# Patient Record
Sex: MALE | Race: BLACK OR AFRICAN AMERICAN | Employment: FULL TIME | URBAN - METROPOLITAN AREA
[De-identification: names, ages, dates, MRNs, and addresses within clinical notes are randomized per-mention and may not be internally consistent; named-entity substitution may affect disease eponyms.]

---

## 2017-03-20 ENCOUNTER — OFFICE VISIT (OUTPATIENT)
Dept: INTERNAL MEDICINE CLINIC | Age: 57
End: 2017-03-20

## 2017-03-20 VITALS
WEIGHT: 187.9 LBS | DIASTOLIC BLOOD PRESSURE: 85 MMHG | BODY MASS INDEX: 30.2 KG/M2 | OXYGEN SATURATION: 97 % | SYSTOLIC BLOOD PRESSURE: 120 MMHG | HEART RATE: 79 BPM | TEMPERATURE: 98.5 F | RESPIRATION RATE: 14 BRPM | HEIGHT: 66 IN

## 2017-03-20 DIAGNOSIS — I10 ESSENTIAL HYPERTENSION: ICD-10-CM

## 2017-03-20 DIAGNOSIS — N52.9 ERECTILE DYSFUNCTION, UNSPECIFIED ERECTILE DYSFUNCTION TYPE: ICD-10-CM

## 2017-03-20 DIAGNOSIS — Z12.5 PROSTATE CANCER SCREENING: ICD-10-CM

## 2017-03-20 DIAGNOSIS — E11.21 TYPE 2 DIABETES MELLITUS WITH DIABETIC NEPHROPATHY, WITHOUT LONG-TERM CURRENT USE OF INSULIN (HCC): Primary | ICD-10-CM

## 2017-03-20 DIAGNOSIS — E78.00 PURE HYPERCHOLESTEROLEMIA: ICD-10-CM

## 2017-03-20 RX ORDER — ROSUVASTATIN CALCIUM 40 MG/1
TABLET, COATED ORAL
Qty: 90 TAB | Refills: 1 | Status: SHIPPED | OUTPATIENT
Start: 2017-03-20 | End: 2017-09-18 | Stop reason: SDUPTHER

## 2017-03-20 RX ORDER — GLIMEPIRIDE 4 MG/1
4 TABLET ORAL
Qty: 90 TAB | Refills: 1 | Status: SHIPPED | OUTPATIENT
Start: 2017-03-20 | End: 2017-09-18 | Stop reason: SDUPTHER

## 2017-03-20 RX ORDER — METFORMIN HYDROCHLORIDE 500 MG/1
TABLET, EXTENDED RELEASE ORAL
Qty: 360 TAB | Refills: 1 | Status: SHIPPED | OUTPATIENT
Start: 2017-03-20 | End: 2017-09-18 | Stop reason: SDUPTHER

## 2017-03-20 RX ORDER — ATENOLOL AND CHLORTHALIDONE TABLET 50; 25 MG/1; MG/1
1 TABLET ORAL DAILY
Qty: 90 TAB | Refills: 1 | Status: SHIPPED | OUTPATIENT
Start: 2017-03-20 | End: 2017-09-18 | Stop reason: SDUPTHER

## 2017-03-20 RX ORDER — BENAZEPRIL HYDROCHLORIDE 40 MG/1
40 TABLET ORAL DAILY
Qty: 90 TAB | Refills: 1 | Status: SHIPPED | OUTPATIENT
Start: 2017-03-20 | End: 2017-09-18 | Stop reason: SDUPTHER

## 2017-03-20 RX ORDER — SILDENAFIL 100 MG/1
100 TABLET, FILM COATED ORAL AS NEEDED
Qty: 24 TAB | Refills: 2 | Status: SHIPPED | OUTPATIENT
Start: 2017-03-20 | End: 2017-11-06 | Stop reason: SDUPTHER

## 2017-03-20 RX ORDER — AMLODIPINE BESYLATE 10 MG/1
10 TABLET ORAL DAILY
Qty: 90 TAB | Refills: 1 | Status: SHIPPED | OUTPATIENT
Start: 2017-03-20 | End: 2017-09-18 | Stop reason: SDUPTHER

## 2017-03-20 NOTE — PROGRESS NOTES
HISTORY OF PRESENT ILLNESS  Prete Bah is a 49 y.o. male. HPI  Here for dm. He has much improved control with his current meds. He exercises. He has htn that is controlled on a complex regimen. His ckd resolved. He is tolerating his statin. He has brief numbness in his feet. He needs refills. Past Medical History:   Diagnosis Date    CKD (chronic kidney disease)     Diabetes ([address]     ED (erectile dysfunction)     Hypercholesterolemia     Hypertension      Current Outpatient Prescriptions   Medication Sig    rosuvastatin (CRESTOR) 40 mg tablet TAKE 1 TABLET BY MOUTH NIGHTLY    glimepiride (AMARYL) 4 mg tablet Take 1 Tab by mouth Daily (before breakfast).  atenolol-chlorthalidone (TENORETIC 50) 50-25 mg per tablet Take 1 Tab by mouth daily.  amLODIPine (NORVASC) 10 mg tablet Take 1 Tab by mouth daily.  canagliflozin (INVOKANA) 300 mg tablet Take 1 Tab by mouth Daily (before breakfast).  benazepril (LOTENSIN) 40 mg tablet Take 1 Tab by mouth daily.  sildenafil citrate (VIAGRA) 100 mg tablet Take 1 Tab by mouth as needed.  metFORMIN ER (GLUCOPHAGE XR) 500 mg tablet TAKE 4 TABLETS BY MOUTH EVERY DAY WITH  DINNER    aspirin delayed-release 81 mg tablet Take  by mouth daily as needed for Pain. No current facility-administered medications for this visit. Review of Systems   All other systems reviewed and are negative. Visit Vitals    /85 (BP 1 Location: Left arm, BP Patient Position: Sitting)    Pulse 79    Temp 98.5 °F (36.9 °C) (Oral)    Resp 14    Ht 5' 6\" (1.676 m)    Wt 187 lb 14.4 oz (85.2 kg)    SpO2 97%    BMI 30.33 kg/m2       Physical Exam   Constitutional: He appears well-developed and well-nourished. Cardiovascular: Normal rate, regular rhythm and normal heart sounds. Pulmonary/Chest: Effort normal and breath sounds normal.   Musculoskeletal:   Foot exam - bilateral normal; no swelling, tenderness or skin or vascular lesions.  Color and temperature is normal. Sensation is intact. Peripheral pulses are palpable. Toenails are normal.     Nursing note and vitals reviewed. Lab Results   Component Value Date/Time    Hemoglobin A1c 7.3 08/29/2016 04:35 PM     Lab Results   Component Value Date/Time    Cholesterol, total 134 08/29/2016 04:35 PM    HDL Cholesterol 35 08/29/2016 04:35 PM    LDL, calculated 62 08/29/2016 04:35 PM    VLDL, calculated 37 08/29/2016 04:35 PM    Triglyceride 185 08/29/2016 04:35 PM    CHOL/HDL Ratio 3.8 10/11/2010 11:20 AM       Lab Results   Component Value Date/Time    Creatinine 1.20 08/29/2016 04:35 PM       ASSESSMENT and Sara Smith was seen today for hypertension and diabetes. Diagnoses and all orders for this visit:    Type 2 diabetes mellitus with diabetic nephropathy, without long-term current use of insulin (HCC)  -     HEMOGLOBIN A1C WITH EAG  -     glimepiride (AMARYL) 4 mg tablet; Take 1 Tab by mouth Daily (before breakfast). -     canagliflozin (INVOKANA) 300 mg tablet; Take 1 Tab by mouth Daily (before breakfast). -     metFORMIN ER (GLUCOPHAGE XR) 500 mg tablet; TAKE 4 TABLETS BY MOUTH EVERY DAY WITH  DINNER  The current medical regimen is effective;  continue present plan and medications. Essential hypertension  -     METABOLIC PANEL, COMPREHENSIVE  -     atenolol-chlorthalidone (TENORETIC 50) 50-25 mg per tablet; Take 1 Tab by mouth daily. -     amLODIPine (NORVASC) 10 mg tablet; Take 1 Tab by mouth daily. -     benazepril (LOTENSIN) 40 mg tablet; Take 1 Tab by mouth daily. The current medical regimen is effective;  continue present plan and medications. Pure hypercholesterolemia  -     LIPID PANEL  -     rosuvastatin (CRESTOR) 40 mg tablet; TAKE 1 TABLET BY MOUTH NIGHTLY   The current medical regimen is effective;  continue present plan and medications.     Prostate cancer screening  -     PSA W/ REFLX FREE PSA    Erectile dysfunction, unspecified erectile dysfunction type  -     sildenafil citrate (VIAGRA) 100 mg tablet; Take 1 Tab by mouth as needed.         Reviewed plan of care with the patient who has provided input and agrees with the goals

## 2017-03-20 NOTE — MR AVS SNAPSHOT
Visit Information Date & Time Provider Department Dept. Phone Encounter #  
 3/20/2017  1:45 PM Bernie Prado MD [facility] [phone] Your Appointments 7/10/2017  8:15 AM  
ROUTINE CARE with Bernie Prado MD  
[facility]ly [facility] [address] [facility]) Appt Note: 3 month f/u  
 [place] [address] Count includes the [facility] [id number] [place] [address] [address]. [address] [phone] Upcoming Health Maintenance Date Due Hepatitis C Screening 1960 COLONOSCOPY 12/1/1980 Pneumococcal 19-64 Medium Risk (1 of 1 - PPSV23) 12/1/1981 DTaP/Tdap/Td series (1 - Tdap) 12/1/1983 EYE EXAM RETINAL OR DILATED Q1 12/16/2015 FOOT EXAM Q1 6/15/2016 INFLUENZA AGE 9 TO ADULT 8/1/2016 HEMOGLOBIN A1C Q6M 2/28/2017 MICROALBUMIN Q1 8/29/2017 LIPID PANEL Q1 8/29/2017 Allergies as of 3/20/2017  Review Complete On: 3/20/2017 By: Xu Tran No Active Allergies Current Immunizations  Never Reviewed No immunizations on file. Not reviewed this visit You Were Diagnosed With   
  
 Codes Comments Type 2 diabetes mellitus with diabetic nephropathy, without long-term current use of insulin (HCC)    -  Primary ICD-10-CM: E11.21 
ICD-9-CM: 250.40, 583.81 Essential hypertension     ICD-10-CM: I10 
ICD-9-CM: 401.9 Pure hypercholesterolemia     ICD-10-CM: E78.00 ICD-9-CM: 272.0 Prostate cancer screening     ICD-10-CM: Z12.5 ICD-9-CM: V76.44 Erectile dysfunction, unspecified erectile dysfunction type     ICD-10-CM: N52.9 ICD-9-CM: 607.84 Vitals BP Pulse Temp Resp Height(growth percentile) Weight(growth percentile) 120/85 (BP 1 Location: Left arm, BP Patient Position: Sitting) 79 98.5 °F (36.9 °C) (Oral) 14 5' 6\" (1.676 m) 187 lb 14.4 oz (85.2 kg) SpO2 BMI Smoking Status 97% 30.33 kg/m2 Never Smoker Vitals History BMI and BSA Data Body Mass Index Body Surface Area  
 30.33 kg/m 2 1.99 m 2 Preferred Pharmacy Pharmacy Name Phone 99 [facility], Kellie Estrada [phone] Your Updated Medication List  
  
   
This list is accurate as of: 3/20/17  2:20 PM.  Always use your most recent med list. amLODIPine 10 mg tablet Commonly known as:  Derian Alstrom Take 1 Tab by mouth daily. aspirin delayed-release 81 mg tablet Take  by mouth daily as needed for Pain. atenolol-chlorthalidone 50-25 mg per tablet Commonly known as:  TENORETIC 50 Take 1 Tab by mouth daily. benazepril 40 mg tablet Commonly known as:  LOTENSIN Take 1 Tab by mouth daily. canagliflozin 300 mg tablet Commonly known as:  Yvetta Eisenmenger Take 1 Tab by mouth Daily (before breakfast). glimepiride 4 mg tablet Commonly known as:  AMARYL Take 1 Tab by mouth Daily (before breakfast). metFORMIN  mg tablet Commonly known as:  GLUCOPHAGE XR  
TAKE 4 TABLETS BY MOUTH EVERY DAY WITH  DINNER  
  
 rosuvastatin 40 mg tablet Commonly known as:  CRESTOR  
TAKE 1 TABLET BY MOUTH NIGHTLY  
  
 sildenafil citrate 100 mg tablet Commonly known as:  VIAGRA Take 1 Tab by mouth as needed. Prescriptions Sent to Pharmacy Refills  
 rosuvastatin (CRESTOR) 40 mg tablet 1 Sig: TAKE 1 TABLET BY MOUTH NIGHTLY Class: Normal  
 Pharmacy: [address] [place], [place] [address]dav[place]  Ph #: [phone]  
 glimepiride (AMARYL) 4 mg tablet 1 Sig: Take 1 Tab by mouth Daily (before breakfast). Class: Normal  
 [facility] [address] [place] [address]  Ph #: [phone] Route: Oral  
 atenolol-chlorthalidone (TENORETIC 50) 50-25 mg per tablet 1 Sig: Take 1 Tab by mouth daily.   
 Class: Normal  
 Pharm[facility]: [address] [place] [address] Ph #: [phone] Route: Oral  
 amLODIPine (NORVASC) 10 mg tablet 1 Sig: Take 1 Tab by mouth daily. Class: Normal  
 [facility] [address] [place] [place] [address]: [phone] Route: Oral  
 canagliflozin (INVOKANA) 300 mg tablet 1 Sig: Take 1 Tab by mouth Daily (before breakfast). Class: Normal  
 [facility] [address] [place] [place] [address]: [phone] Route: Oral  
 benazepril (LOTENSIN) 40 mg tablet 1 Sig: Take 1 Tab by mouth daily. Class: Normal  
 [facility] [address] [place] [place] [address] Ph #: [phone] Route: Oral  
 sildenafil citrate (VIAGRA) 100 mg tablet 2 Sig: Take 1 Tab by mouth as needed. Class: Normal  
 Pharmacy: [address] [place] [address] Ph #: [phone] Route: Oral  
 metFORMIN ER (GLUCOPHAGE XR) 500 mg tablet 1 Sig: TAKE 4 TABLETS BY MOUTH EVERY DAY WITH  DINNER Class: Normal  
 [facility] [address] [place] [place] [address] Ph #: [phone] We Performed the Following HEMOGLOBIN A1C WITH EAG [00801 CPT(R)] LIPID PANEL [24557 CPT(R)] METABOLIC PANEL, COMPREHENSIVE [48332 CPT(R)] PSA W/ REFLX FREE PSA [81638 CPT(R)] Introducing [facility] & HEALTH SERVICES! Nikolas See introduces TrialBee patient portal. Now you can access parts of your medical record, email your doctor's office, and request medication refills online. 1. In your internet browser, go to [email or web address] 2. Click on the First Time User? Click Here link in the Sign In box. You will see the New Member Sign Up page. 3. Enter your TrialBee Access Code exactly as it appears below. You will not need to use this code after youve completed the sign-up process. If you do not sign up before the expiration date, you must request a new code. · TrialBee Access Code: [email or web address] Expires: 6/18/2017  2:20 PM 
 
4. Enter the last four digits of your Social Security Number (xxxx) and Date of Birth (mm/dd/yyyy) as indicated and click Submit. You will be taken to the next sign-up page. 5. Create a Stiki Digital ID. This will be your Stiki Digital login ID and cannot be changed, so think of one that is secure and easy to remember. 6. Create a Stiki Digital password. You can change your password at any time. 7. Enter your Password Reset Question and Answer. This can be used at a later time if you forget your password. 8. Enter your e-mail address. You will receive e-mail notification when new information is available in [address] 9. Click Sign Up. You can now view and download portions of your medical record. 10. Click the Download Summary menu link to download a portable copy of your medical information. If you have questions, please visit the Frequently Asked Questions section of the Stiki Digital website. Remember, Stiki Digital is NOT to be used for urgent needs. For medical emergencies, dial 911. Now available from your iPhone and Android! Please provide this summary of care documentation to your next provider. Your primary care clinician is listed as [address] [address] [place] [address] [address]. If you have any questions after today's visit, please call [phone].

## 2017-03-21 LAB
ALBUMIN SERPL-MCNC: 4.5 G/DL (ref 3.5–5.5)
ALBUMIN/GLOB SERPL: 1.3 {RATIO} (ref 1.2–2.2)
ALP SERPL-CCNC: 68 IU/L (ref 39–117)
ALT SERPL-CCNC: 22 IU/L (ref 0–44)
AST SERPL-CCNC: 18 IU/L (ref 0–40)
BILIRUB SERPL-MCNC: 0.9 MG/DL (ref 0–1.2)
BUN SERPL-MCNC: 20 MG/DL (ref 6–24)
BUN/CREAT SERPL: 17 (ref 9–20)
CALCIUM SERPL-MCNC: 9.8 MG/DL (ref 8.7–10.2)
CHLORIDE SERPL-SCNC: 93 MMOL/L (ref 96–106)
CHOLEST SERPL-MCNC: 116 MG/DL (ref 100–199)
CO2 SERPL-SCNC: 25 MMOL/L (ref 18–29)
CREAT SERPL-MCNC: 1.18 MG/DL (ref 0.76–1.27)
EST. AVERAGE GLUCOSE BLD GHB EST-MCNC: 163 MG/DL
GLOBULIN SER CALC-MCNC: 3.6 G/DL (ref 1.5–4.5)
GLUCOSE SERPL-MCNC: 84 MG/DL (ref 65–99)
HBA1C MFR BLD: 7.3 % (ref 4.8–5.6)
HDLC SERPL-MCNC: 33 MG/DL
INTERPRETATION, 910389: NORMAL
LDLC SERPL CALC-MCNC: 54 MG/DL (ref 0–99)
Lab: NORMAL
POTASSIUM SERPL-SCNC: 3.6 MMOL/L (ref 3.5–5.2)
PROT SERPL-MCNC: 8.1 G/DL (ref 6–8.5)
PSA SERPL-MCNC: 1.2 NG/ML (ref 0–4)
REFLEX CRITERIA: NORMAL
SODIUM SERPL-SCNC: 140 MMOL/L (ref 134–144)
TRIGL SERPL-MCNC: 147 MG/DL (ref 0–149)
VLDLC SERPL CALC-MCNC: 29 MG/DL (ref 5–40)

## 2017-09-18 ENCOUNTER — OFFICE VISIT (OUTPATIENT)
Dept: INTERNAL MEDICINE CLINIC | Age: 57
End: 2017-09-18

## 2017-09-18 VITALS
HEIGHT: 66 IN | BODY MASS INDEX: 29.47 KG/M2 | DIASTOLIC BLOOD PRESSURE: 86 MMHG | RESPIRATION RATE: 16 BRPM | WEIGHT: 183.4 LBS | TEMPERATURE: 98.1 F | OXYGEN SATURATION: 98 % | SYSTOLIC BLOOD PRESSURE: 111 MMHG | HEART RATE: 73 BPM

## 2017-09-18 DIAGNOSIS — E78.00 PURE HYPERCHOLESTEROLEMIA: ICD-10-CM

## 2017-09-18 DIAGNOSIS — Z11.3 SCREEN FOR STD (SEXUALLY TRANSMITTED DISEASE): ICD-10-CM

## 2017-09-18 DIAGNOSIS — I10 ESSENTIAL HYPERTENSION: ICD-10-CM

## 2017-09-18 DIAGNOSIS — E11.21 TYPE 2 DIABETES MELLITUS WITH DIABETIC NEPHROPATHY, WITHOUT LONG-TERM CURRENT USE OF INSULIN (HCC): Primary | ICD-10-CM

## 2017-09-18 RX ORDER — METFORMIN HYDROCHLORIDE 500 MG/1
TABLET, EXTENDED RELEASE ORAL
Qty: 360 TAB | Refills: 1 | Status: SHIPPED | OUTPATIENT
Start: 2017-09-18 | End: 2018-09-21 | Stop reason: SDUPTHER

## 2017-09-18 RX ORDER — BENAZEPRIL HYDROCHLORIDE 40 MG/1
40 TABLET ORAL DAILY
Qty: 90 TAB | Refills: 1 | Status: SHIPPED | OUTPATIENT
Start: 2017-09-18 | End: 2018-03-28 | Stop reason: SDUPTHER

## 2017-09-18 RX ORDER — AMLODIPINE BESYLATE 10 MG/1
10 TABLET ORAL DAILY
Qty: 90 TAB | Refills: 1 | Status: SHIPPED | OUTPATIENT
Start: 2017-09-18 | End: 2018-03-28 | Stop reason: SDUPTHER

## 2017-09-18 RX ORDER — ATENOLOL AND CHLORTHALIDONE TABLET 50; 25 MG/1; MG/1
1 TABLET ORAL DAILY
Qty: 90 TAB | Refills: 1 | Status: SHIPPED | OUTPATIENT
Start: 2017-09-18 | End: 2018-03-28 | Stop reason: SDUPTHER

## 2017-09-18 RX ORDER — GLIMEPIRIDE 4 MG/1
4 TABLET ORAL
Qty: 90 TAB | Refills: 1 | Status: SHIPPED | OUTPATIENT
Start: 2017-09-18 | End: 2018-03-28 | Stop reason: SDUPTHER

## 2017-09-18 RX ORDER — ROSUVASTATIN CALCIUM 40 MG/1
TABLET, COATED ORAL
Qty: 90 TAB | Refills: 1 | Status: SHIPPED | OUTPATIENT
Start: 2017-09-18 | End: 2018-09-21 | Stop reason: SDUPTHER

## 2017-09-18 NOTE — PROGRESS NOTES
1. Have you been to the ER, urgent care clinic since your last visit? Hospitalized since your last visit? No    2. Have you seen or consulted any other health care providers outside of the [address] [place] [address] since your last visit? Include any pap smears or colon screening.  No   Chief Complaint   Patient presents with    Diabetes     3 months follow up    Hypertension     3 months follow up    Cholesterol Problem     3 months follow up     Fasting

## 2017-09-18 NOTE — MR AVS SNAPSHOT
Visit Information Date & Time Provider Department Dept. Phone Encounter #  
 9/18/2017 10:30 AM Nicola Blanchard MD [facility] [phone] [id number] Upcoming Health Maintenance Date Due Hepatitis C Screening 1960 COLONOSCOPY 12/1/1980 Pneumococcal 19-64 Medium Risk (1 of 1 - PPSV23) 12/1/1981 DTaP/Tdap/Td series (1 - Tdap) 12/1/1983 EYE EXAM RETINAL OR DILATED Q1 12/16/2015 INFLUENZA AGE 9 TO ADULT 8/1/2017 MICROALBUMIN Q1 8/29/2017 HEMOGLOBIN A1C Q6M 9/20/2017 FOOT EXAM Q1 3/20/2018 LIPID PANEL Q1 3/20/2018 Allergies as of 9/18/2017  Review Complete On: 9/18/2017 By: Rocael Stephenson No Active Allergies Current Immunizations  Never Reviewed No immunizations on file. Not reviewed this visit You Were Diagnosed With   
  
 Codes Comments Type 2 diabetes mellitus with diabetic nephropathy, without long-term current use of insulin (HCC)    -  Primary ICD-10-CM: E11.21 
ICD-9-CM: 250.40, 583.81 Pure hypercholesterolemia     ICD-10-CM: E78.00 ICD-9-CM: 272.0 Essential hypertension     ICD-10-CM: I10 
ICD-9-CM: 401.9 Screen for STD (sexually transmitted disease)     ICD-10-CM: Z11.3 ICD-9-CM: V74.5 Vitals BP Pulse Temp Resp Height(growth percentile) Weight(growth percentile) 111/86 (BP 1 Location: Left arm, BP Patient Position: At rest) 73 98.1 °F (36.7 °C) (Oral) 16 5' 6\" (1.676 m) 183 lb 6.4 oz (83.2 kg) SpO2 BMI Smoking Status 98% 29.6 kg/m2 Never Smoker BMI and BSA Data Body Mass Index Body Surface Area  
 29.6 kg/m 2 1.97 m 2 Preferred Pharmacy Pharmacy Name Phone 99 [facility], [facility] Rosemary Estrada [phone] Your Updated Medication List  
  
   
This list is accurate as of: 9/18/17 10:51 AM.  Always use your most recent med list. amLODIPine 10 mg tablet Commonly known as:  Giovanni Rings Take 1 Tab by mouth daily. aspirin delayed-release 81 mg tablet Take  by mouth daily as needed for Pain. atenolol-chlorthalidone 50-25 mg per tablet Commonly known as:  TENORETIC 50 Take 1 Tab by mouth daily. benazepril 40 mg tablet Commonly known as:  LOTENSIN Take 1 Tab by mouth daily. empagliflozin 25 mg tablet Commonly known as:  Dellis Mutters Take 1 Tab by mouth daily. glimepiride 4 mg tablet Commonly known as:  AMARYL Take 1 Tab by mouth Daily (before breakfast). metFORMIN  mg tablet Commonly known as:  GLUCOPHAGE XR  
TAKE 4 TABLETS BY MOUTH EVERY DAY WITH  DINNER  
  
 rosuvastatin 40 mg tablet Commonly known as:  CRESTOR  
TAKE 1 TABLET BY MOUTH NIGHTLY  
  
 sildenafil citrate 100 mg tablet Commonly known as:  VIAGRA Take 1 Tab by mouth as needed. Prescriptions Sent to Pharmacy Refills  
 rosuvastatin (CRESTOR) 40 mg tablet 1 Sig: TAKE 1 TABLET BY MOUTH NIGHTLY Class: Normal  
 Pharmacy: [address] [place] [address], [facility]  Ph #: [phone]  
 glimepiride (AMARYL) 4 mg tablet 1 Sig: Take 1 Tab by mouth Daily (before breakfast). Class: Normal  
 Pharmacy: [address] [place] [address], [facility]  Ph #: [phone] Route: Oral  
 atenolol-chlorthalidone (TENORETIC 50) 50-25 mg per tablet 1 Sig: Take 1 Tab by mouth daily. Class: Normal  
 Pharmacy: [address] [place] [address], [facility]  Ph #: [phone] Route: Oral  
 amLODIPine (NORVASC) 10 mg tablet 1 Sig: Take 1 Tab by mouth daily. Class: Normal  
 Pharmacy: [address] [place] [address], [facility] Ph #: [phone] Route: Oral  
 benazepril (LOTENSIN) 40 mg tablet 1 Sig: Take 1 Tab by mouth daily. Class: Normal  
 Pharmacy: [address] [place] [address], [facility] Ph #: [phone]  Route: Oral  
 metFORMIN ER (GLUCOPHAGE XR) 500 mg tablet 1 Sig: TAKE 4 TABLETS BY MOUTH EVERY DAY WITH  DINNER Class: Normal  
 Pharmacy: [address] [facility] Ph #: [phone]  
 empagliflozin (JARDIANCE) 25 mg tablet 1 Sig: Take 1 Tab by mouth daily. Class: Normal  
 Pharmacy: [address] [facility] Ph #: [phone] Route: Oral  
  
We Performed the Following HEMOGLOBIN A1C WITH EAG [54717 CPT(R)] HEPATITIS C AB [09921 CPT(R)] HIV 1/2 AG/AB, 4TH GENERATION,W RFLX CONFIRM [DBD71250 Custom]  DIABETES FOOT EXAM [HM7 Custom] LIPID PANEL [57432 CPT(R)] METABOLIC PANEL, COMPREHENSIVE [29121 CPT(R)] Introducing [facility] & [facility]! [employer] introduces CPXi patient portal. Now you can access parts of your medical record, email your doctor's office, and request medication refills online. 1. In your internet browser, go to [email or web address] 2. Click on the First Time User? Click Here link in the Sign In box. You will see the New Member Sign Up page. 3. Enter your CPXi Access Code exactly as it appears below. You will not need to use this code after youve completed the sign-up process. If you do not sign up before the expiration date, you must request a new code. · CPXi Access Code: [email or web address] Expires: 12/17/2017 10:51 AM 
 
4. Enter the last four digits of your Social Security Number (xxxx) and Date of Birth (mm/dd/yyyy) as indicated and click Submit. You will be taken to the next sign-up page. 5. Create a Mango Telecomt ID. This will be your CPXi login ID and cannot be changed, so think of one that is secure and easy to remember. 6. Create a CPXi password. You can change your password at any time. 7. Enter your Password Reset Question and Answer. This can be used at a later time if you forget your password. 8. Enter your e-mail address. You will receive e-mail notification when new information is available in [address] 9. Click Sign Up. You can now view and download portions of your medical record. 10. Click the Download Summary menu link to download a portable copy of your medical information. If you have questions, please visit the Frequently Asked Questions section of the KidsCash website. Remember, KidsCash is NOT to be used for urgent needs. For medical emergencies, dial 911. Now available from your iPhone and Android! Please provide this summary of care documentation to your next provider. Your primary care clinician is listed as [address] [place] [address] [address]. If you have any questions after today's visit, please call [phone].

## 2017-09-18 NOTE — PROGRESS NOTES
HISTORY OF PRESENT ILLNESS  Kortney Otto is a 49 y.o. male. HPI  Here for DM. He is controlled on oral agents. He is exercising sporadically and trying to watch his carbs. He is on oral agents. He has controlled HTN on a complex regimen. He is tolerating a statin for hld. He wants STD screen per his new girlfriend.,  Past Medical History:   Diagnosis Date    CKD (chronic kidney disease)     Diabetes ([address]     ED (erectile dysfunction)     Hypercholesterolemia     Hypertension      Current Outpatient Prescriptions   Medication Sig    rosuvastatin (CRESTOR) 40 mg tablet TAKE 1 TABLET BY MOUTH NIGHTLY    glimepiride (AMARYL) 4 mg tablet Take 1 Tab by mouth Daily (before breakfast).  atenolol-chlorthalidone (TENORETIC 50) 50-25 mg per tablet Take 1 Tab by mouth daily.  amLODIPine (NORVASC) 10 mg tablet Take 1 Tab by mouth daily.  benazepril (LOTENSIN) 40 mg tablet Take 1 Tab by mouth daily.  metFORMIN ER (GLUCOPHAGE XR) 500 mg tablet TAKE 4 TABLETS BY MOUTH EVERY DAY WITH  DINNER    empagliflozin (JARDIANCE) 25 mg tablet Take 1 Tab by mouth daily.  sildenafil citrate (VIAGRA) 100 mg tablet Take 1 Tab by mouth as needed.  aspirin delayed-release 81 mg tablet Take  by mouth daily as needed for Pain. No current facility-administered medications for this visit. Review of Systems   All other systems reviewed and are negative. Visit Vitals    /86 (BP 1 Location: Left arm, BP Patient Position: At rest)    Pulse 73    Temp 98.1 °F (36.7 °C) (Oral)    Resp 16    Ht 5' 6\" (1.676 m)    Wt 183 lb 6.4 oz (83.2 kg)    SpO2 98%    BMI 29.6 kg/m2       Physical Exam   Constitutional: He appears well-developed and well-nourished. Cardiovascular: Normal rate, regular rhythm and normal heart sounds. Pulmonary/Chest: Effort normal and breath sounds normal. No respiratory distress. He has no wheezes. He has no rales.    Musculoskeletal:   Foot exam - bilateral normal; no swelling, tenderness or skin or vascular lesions. Color and temperature is normal. Sensation is intact. Peripheral pulses are palpable. Toenails are normal.     Nursing note and vitals reviewed. Lab Results   Component Value Date/Time    Hemoglobin A1c 7.3 03/20/2017 02:26 PM     Lab Results   Component Value Date/Time    Cholesterol, total 116 03/20/2017 02:26 PM    HDL Cholesterol 33 03/20/2017 02:26 PM    LDL, calculated 54 03/20/2017 02:26 PM    VLDL, calculated 29 03/20/2017 02:26 PM    Triglyceride 147 03/20/2017 02:26 PM    CHOL/HDL Ratio 3.8 10/11/2010 11:20 AM       ASSESSMENT and PLAN  Diagnoses and all orders for this visit:    1. Type 2 diabetes mellitus with diabetic nephropathy, without long-term current use of insulin (HCC)  -     glimepiride (AMARYL) 4 mg tablet; Take 1 Tab by mouth Daily (before breakfast). -     metFORMIN ER (GLUCOPHAGE XR) 500 mg tablet; TAKE 4 TABLETS BY MOUTH EVERY DAY WITH  DINNER  -     empagliflozin (JARDIANCE) 25 mg tablet; Take 1 Tab by mouth daily.  -     HEMOGLOBIN A1C WITH EAG  -      DIABETES FOOT EXAM  The current medical regimen is effective;  continue present plan and medications. He will stop invokana with recent literature regarding PAD. 2. Pure hypercholesterolemia  -     rosuvastatin (CRESTOR) 40 mg tablet; TAKE 1 TABLET BY MOUTH NIGHTLY  -     LIPID PANEL  The current medical regimen is effective;  continue present plan and medications. 3. Essential hypertension  -     atenolol-chlorthalidone (TENORETIC 50) 50-25 mg per tablet; Take 1 Tab by mouth daily. -     amLODIPine (NORVASC) 10 mg tablet; Take 1 Tab by mouth daily. -     benazepril (LOTENSIN) 40 mg tablet; Take 1 Tab by mouth daily.  -     METABOLIC PANEL, COMPREHENSIVE  The current medical regimen is effective;  continue present plan and medications.     4. Screen for STD (sexually transmitted disease)  -     HIV 1/2 AG/AB, 4TH GENERATION,W RFLX CONFIRM  -     HEPATITIS C AB    Reviewed plan of care with the patient who has provided input and agrees with the goals

## 2017-09-19 LAB
ALBUMIN SERPL-MCNC: 4.5 G/DL (ref 3.5–5.5)
ALBUMIN/GLOB SERPL: 1.3 {RATIO} (ref 1.2–2.2)
ALP SERPL-CCNC: 71 IU/L (ref 39–117)
ALT SERPL-CCNC: 18 IU/L (ref 0–44)
AST SERPL-CCNC: 16 IU/L (ref 0–40)
BILIRUB SERPL-MCNC: 1.2 MG/DL (ref 0–1.2)
BUN SERPL-MCNC: 26 MG/DL (ref 6–24)
BUN/CREAT SERPL: 17 (ref 9–20)
CALCIUM SERPL-MCNC: 9.9 MG/DL (ref 8.7–10.2)
CHLORIDE SERPL-SCNC: 97 MMOL/L (ref 96–106)
CHOLEST SERPL-MCNC: 132 MG/DL (ref 100–199)
CO2 SERPL-SCNC: 27 MMOL/L (ref 18–29)
CREAT SERPL-MCNC: 1.51 MG/DL (ref 0.76–1.27)
EST. AVERAGE GLUCOSE BLD GHB EST-MCNC: 151 MG/DL
GLOBULIN SER CALC-MCNC: 3.6 G/DL (ref 1.5–4.5)
GLUCOSE SERPL-MCNC: 102 MG/DL (ref 65–99)
HBA1C MFR BLD: 6.9 % (ref 4.8–5.6)
HCV AB S/CO SERPL IA: <0.1 S/CO RATIO (ref 0–0.9)
HDLC SERPL-MCNC: 35 MG/DL
HIV 1+2 AB+HIV1 P24 AG SERPL QL IA: NON REACTIVE
INTERPRETATION, 910389: NORMAL
INTERPRETATION: NORMAL
LDLC SERPL CALC-MCNC: 67 MG/DL (ref 0–99)
Lab: NORMAL
PDF IMAGE, 910387: NORMAL
POTASSIUM SERPL-SCNC: 4 MMOL/L (ref 3.5–5.2)
PROT SERPL-MCNC: 8.1 G/DL (ref 6–8.5)
SODIUM SERPL-SCNC: 140 MMOL/L (ref 134–144)
TRIGL SERPL-MCNC: 150 MG/DL (ref 0–149)
VLDLC SERPL CALC-MCNC: 30 MG/DL (ref 5–40)

## 2017-09-27 ENCOUNTER — TELEPHONE (OUTPATIENT)
Dept: INTERNAL MEDICINE CLINIC | Age: 57
End: 2017-09-27

## 2017-09-28 NOTE — TELEPHONE ENCOUNTER
Spoke with patient. Advised per Dr Omalley Ear that Do not fast next visit which should lower the creatinine.  Patient verbalized understanding

## 2017-11-21 DIAGNOSIS — N52.9 ERECTILE DYSFUNCTION, UNSPECIFIED ERECTILE DYSFUNCTION TYPE: ICD-10-CM

## 2017-11-21 RX ORDER — SILDENAFIL 100 MG/1
100 TABLET, FILM COATED ORAL AS NEEDED
Qty: 24 TAB | Refills: 11 | Status: SHIPPED | OUTPATIENT
Start: 2017-11-21 | End: 2019-08-07 | Stop reason: SDUPTHER

## 2018-03-19 ENCOUNTER — OFFICE VISIT (OUTPATIENT)
Dept: INTERNAL MEDICINE CLINIC | Age: 58
End: 2018-03-19

## 2018-03-19 VITALS
BODY MASS INDEX: 29.41 KG/M2 | OXYGEN SATURATION: 96 % | SYSTOLIC BLOOD PRESSURE: 114 MMHG | WEIGHT: 183 LBS | RESPIRATION RATE: 20 BRPM | TEMPERATURE: 98.4 F | HEIGHT: 66 IN | DIASTOLIC BLOOD PRESSURE: 72 MMHG | HEART RATE: 73 BPM

## 2018-03-19 DIAGNOSIS — I10 ESSENTIAL HYPERTENSION: ICD-10-CM

## 2018-03-19 DIAGNOSIS — E78.00 PURE HYPERCHOLESTEROLEMIA: ICD-10-CM

## 2018-03-19 DIAGNOSIS — Z12.11 COLON CANCER SCREENING: ICD-10-CM

## 2018-03-19 DIAGNOSIS — E11.21 TYPE 2 DIABETES MELLITUS WITH DIABETIC NEPHROPATHY, WITHOUT LONG-TERM CURRENT USE OF INSULIN (HCC): Primary | ICD-10-CM

## 2018-03-19 NOTE — MR AVS SNAPSHOT
[address] [place] [address] [address] [facility] 
[phone] Patient: Mirella Orourke MRN: [id number] :1960 Visit Information Date & Time Provider Department Dept. Phone Encounter #  
 3/19/2018 10:30 AM Santa Hahn PA-C [facility] [phone] [id number] Upcoming Health Maintenance Date Due COLONOSCOPY 1980 Pneumococcal 19-64 Medium Risk (1 of 1 - PPSV23) 1981 DTaP/Tdap/Td series (1 - Tdap) 1983 EYE EXAM RETINAL OR DILATED Q1 2015 Influenza Age 5 to Adult 2017 MICROALBUMIN Q1 2017 HEMOGLOBIN A1C Q6M 3/18/2018 FOOT EXAM Q1 2018 LIPID PANEL Q1 2018 Allergies as of 3/19/2018  In Progress On: 3/19/2018 By: Glorya Hashimoto, LPN No Active Allergies Current Immunizations  Never Reviewed No immunizations on file. Not reviewed this visit You Were Diagnosed With   
  
 Codes Comments Type 2 diabetes mellitus with diabetic nephropathy, without long-term current use of insulin (HCC)    -  Primary ICD-10-CM: E11.21 
ICD-9-CM: 250.40, 583.81 Essential hypertension     ICD-10-CM: I10 
ICD-9-CM: 401.9 Pure hypercholesterolemia     ICD-10-CM: E78.00 ICD-9-CM: 272.0 Colon cancer screening     ICD-10-CM: Z12.11 ICD-9-CM: V76.51 Vitals BP Pulse Temp Resp Height(growth percentile) Weight(growth percentile) 114/72 73 98.4 °F (36.9 °C) (Oral) 20 5' 6\" (1.676 m) 183 lb (83 kg) SpO2 BMI Smoking Status 96% 29.54 kg/m2 Never Smoker Vitals History BMI and BSA Data Body Mass Index Body Surface Area  
 29.54 kg/m 2 1.97 m 2 Preferred Pharmacy Pharmacy Name Phone [facility] - Bobby Sinhama - [address] [place] [address],[address] Your Updated Medication List  
  
   
This list is accurate as of 3/19/18 11:30 AM.  Always use your most recent med list.  
  
  
 amLODIPine 10 mg tablet Commonly known as:  Thayer Sandhoff Take 1 Tab by mouth daily. aspirin delayed-release 81 mg tablet Take  by mouth daily as needed for Pain. atenolol-chlorthalidone 50-25 mg per tablet Commonly known as:  TENORETIC 50 Take 1 Tab by mouth daily. benazepril 40 mg tablet Commonly known as:  LOTENSIN Take 1 Tab by mouth daily. empagliflozin 25 mg tablet Commonly known as:  Merlin Martinis Take 1 Tab by mouth daily. glimepiride 4 mg tablet Commonly known as:  AMARYL Take 1 Tab by mouth Daily (before breakfast). metFORMIN  mg tablet Commonly known as:  GLUCOPHAGE XR  
TAKE 4 TABLETS BY MOUTH EVERY DAY WITH  DINNER  
  
 rosuvastatin 40 mg tablet Commonly known as:  CRESTOR  
TAKE 1 TABLET BY MOUTH NIGHTLY  
  
 sildenafil citrate 100 mg tablet Commonly known as:  VIAGRA Take 1 Tab by mouth as needed. We Performed the Following HEMOGLOBIN A1C WITH EAG [43388 CPT(R)] LIPID PANEL [92323 CPT(R)] METABOLIC PANEL, COMPREHENSIVE [20851 CPT(R)] MICROALBUMIN, UR, RAND W/ MICROALB/CREAT RATIO [id number] CPT(R)] REFERRAL TO GASTROENTEROLOGY [NPN33 Custom] Referral Information Referral ID Referred By Referred To  
  
 [id number] Radha TORRES MD   
   [address] [place] [address] Phone: [phone] Fax: [phone] Visits Status Start Date End Date 1 New Request 3/19/18 3/19/19 If your referral has a status of pending review or denied, additional information will be sent to support the outcome of this decision. Introducing [facility]! Lyndsay Johnson introduces [email or web address] patient portal. Now you can access parts of your medical record, email your doctor's office, and request medication refills online. 1. In your internet browser, go to [email or web address] [email or web address] 2. Click on the First Time User? Click Here link in the Sign In box. You will see the New Member Sign Up page. 3. Enter your Cod[employer] Access Code exactly as it appears below. You will not need to use this code after youve completed the sign-up process. If you do not sign up before the expiration date, you must request a new code. · Cod[employer] Access Code: [facility] Expires: 6/17/2018 11:30 AM 
 
4. Enter the last four digits of your Social Security Number (xxxx) and Date of Birth (mm/dd/yyyy) as indicated and click Submit. You will be taken to the next sign-up page. 5. Create a Codasip ID. This will be your Codasip login ID and cannot be changed, so think of one that is secure and easy to remember. 6. Create a Cod[employer] password. You can change your password at any time. 7. Enter your Password Reset Question and Answer. This can be used at a later time if you forget your password. 8. Enter your e-mail address. You will receive e-mail notification when new information is available in [address] 9. Click Sign Up. You can now view and download portions of your medical record. 10. Click the Download Summary menu link to download a portable copy of your medical information. If you have questions, please visit the Frequently Asked Questions section of the [employer] website. Remember, [employer]ip is NOT to be used for urgent needs. For medical emergencies, dial 911. Now available from your iPhone and Android! Please provide this summary of care documentation to your next provider. Your primary care clinician is listed as [id number] [address] [place] [address] [address]. If you have any questions after today's visit, please call [phone].

## 2018-03-19 NOTE — PROGRESS NOTES
Subjective: Dick Mclaughlin is a 56 y.o. male seen for follow up of diabetes. He also has diabetes, hypertension and hyperlipidemia. Diabetic Review of Systems - medication compliance: compliant all of the time, diabetic diet compliance: \"could be better\". Other symptoms and concerns: no concerns at this time. Will need labs done today  Patient admits right now he is not exercising regularly. He plans to get back on track with this once the weather turns warm again. He likes to eat the halo oranges and apples. He admits to still eating steak and hamburger. He does eat some fish and chicken. Patient Active Problem List    Diagnosis Date Noted    Essential hypertension 03/20/2017    Pure hypercholesterolemia 03/20/2017    Type 2 diabetes mellitus with diabetic nephropathy ([facility] [address] 11/09/2015    ED (erectile dysfunction) 05/09/2011    CKD (chronic kidney disease)      No Active Allergies          Review of Systems  Pertinent items are noted in HPI. Objective:     Visit Vitals    /72    Pulse 73    Temp 98.4 °F (36.9 °C) (Oral)    Resp 20    Ht 5' 6\" (1.676 m)    Wt 183 lb (83 kg)    SpO2 96%    BMI 29.54 kg/m2     Appearance: alert, well appearing, and in no distress. Exam: heart sounds normal rate and regular rhythm, no murmurs noted, chest clear, feet: no peripheral edema  Lab review: orders written for new lab studies as appropriate; see orders. Assessment/Plan:     diabetes stable, hypertension well controlled, hyperlipidemia control uncertain. Diagnoses and all orders for this visit:    1. Type 2 diabetes mellitus with diabetic nephropathy, without long-term current use of insulin ([facility])  -     METABOLIC PANEL, COMPREHENSIVE  -     HEMOGLOBIN A1C WITH EAG  -     MICROALBUMIN, UR, RAND W/ MICROALB/CREAT RATIO    2. Essential hypertension    3. Pure hypercholesterolemia  -     LIPID PANEL    4. Colon cancer screening  -     REFERRAL TO GASTROENTEROLOGY    patient to get labs done. We discussed today making an appointment to have his colonoscopy done. Routine labs ordered today. I will contact him with the labs once back. All this discussed with the patient and he understands and agrees.

## 2018-03-19 NOTE — PROGRESS NOTES
Reviewed record in preparation for visit and have obtained necessary documentation. Identified pt with two pt identifiers(name and ). Health Maintenance Due   Topic    COLONOSCOPY     Pneumococcal 19-64 Medium Risk (1 of 1 - PPSV23)    DTaP/Tdap/Td series (1 - Tdap)    EYE EXAM RETINAL OR DILATED Q1     Influenza Age 5 to Adult     MICROALBUMIN Q1     HEMOGLOBIN A1C Q6M          Chief Complaint   Patient presents with    Follow Up Chronic Condition     Pain of 2 fatty liver, diabetes, blood work,         [employer] Readings from Last 3 Encounters:   18 183 lb (83 kg)   17 183 lb 6.4 oz (83.2 kg)   17 187 lb 14.4 oz (85.2 kg)     Temp Readings from Last 3 Encounters:   18 98.4 °F (36.9 °C) (Oral)   17 98.1 °F (36.7 °C) (Oral)   17 98.5 °F (36.9 °C) (Oral)     BP Readings from Last 3 Encounters:   18 114/72   17 111/86   17 120/85     Pulse Readings from Last 3 Encounters:   18 73   17 73   17 79           Learning Assessment:  :     Learning Assessment 2014   PRIMARY LEARNER Patient   PRIMARY LANGUAGE ENGLISH   LEARNER PREFERENCE PRIMARY READING     DEMONSTRATION     VIDEOS   ANSWERED BY patient   RELATIONSHIP SELF       Depression Screening:  :     PHQ over the last two weeks 3/19/2018   Little interest or pleasure in doing things Not at all   Feeling down, depressed or hopeless Not at all   Total Score PHQ 2 0       Fall Risk Assessment:  :     No flowsheet data found. Abuse Screening:  :     Abuse Screening Questionnaire 2017   Do you ever feel afraid of your partner? N   Are you in a relationship with someone who physically or mentally threatens you? N   Is it safe for you to go home?  Y       Coordination of Care Questionnaire:  :

## 2018-03-19 NOTE — LETTER
3/22/2018 10:45 AM 
 
Mr. Carrie Holder [address] [facility] [address] Dear Carrie Holder: 
 
Please find your most recent results below. We tried to contact you via phone without success. Your kidney function improved a little. Your hemoglobin a1c is elevated this time at 7.4. (I know you are  expecting this because you had not been watching your diet) triglycerides are a little elevated this time as well. Please get back on schedule with your diet and exercise. Please call and make a follow up in 3-4 months with Dr. Mueller [facility] or Marlys Raymundo. Resulted Orders METABOLIC PANEL, COMPREHENSIVE Result Value Ref Range Glucose 115 (H) 65 - 99 mg/dL BUN 25 (H) 6 - 24 mg/dL Creatinine 1.42 (H) 0.76 - 1.27 mg/dL GFR est non-AA 55 (L) >59 mL/min/1.73 GFR est AA 64 >59 mL/min/1.73  
 BUN/Creatinine ratio 18 9 - 20 Sodium 142 134 - 144 mmol/L Potassium 3.6 3.5 - 5.2 mmol/L Chloride 96 96 - 106 mmol/L  
 CO2 30 (H) 18 - 29 mmol/L Calcium 9.6 8.7 - 10.2 mg/dL Protein, total 8.3 6.0 - 8.5 g/dL Albumin 4.4 3.5 - 5.5 g/dL GLOBULIN, TOTAL 3.9 1.5 - 4.5 g/dL A-G Ratio 1.1 (L) 1.2 - 2.2 Bilirubin, total 1.0 0.0 - 1.2 mg/dL Alk. phosphatase 75 39 - 117 IU/L  
 AST (SGOT) 24 0 - 40 IU/L  
 ALT (SGPT) 21 0 - 44 IU/L Narrative Performed at:  [address]  [id number] : Eldon Hardy MD, Phone:  [phone] HEMOGLOBIN A1C WITH EAG Result Value Ref Range Hemoglobin A1c 7.4 (H) 4.8 - 5.6 % Comment:  
            Pre-diabetes: 5.7 - 6.4 Diabetes: >6.4 Glycemic control for adults with diabetes: <7.0 Estimated average glucose 166 mg/dL Narrative Performed at:  [address] [place]  [id number] : Eldon Hardy MD, Phone:  [phone] LIPID PANEL Result Value Ref Range Cholesterol, total 127 100 - 199 mg/dL Triglyceride 175 (H) 0 - 149 mg/dL HDL Cholesterol 33 (L) >39 mg/dL VLDL, calculated 35 5 - 40 mg/dL LDL, calculated 59 0 - 99 mg/dL Narrative Performed at:  [address]  [id number] : Ivet Sims MD, Phone:  [phone] MICROALBUMIN, UR, RAND W/ MICROALB/CREAT RATIO Result Value Ref Range Creatinine, urine 66.3 Not Estab. mg/dL Microalbumin, urine 3.1 Not Estab. ug/mL Microalb/Creat ratio (ug/mg creat.) 4.7 0.0 - 30.0 mg/g creat Narrative Performed at:  [address]  [id number] : Ivet Sims MD, Phone:  [phone] CVD REPORT Result Value Ref Range INTERPRETATION Note Comment:  
   Supplemental report is available. PDF IMAGE Not applicable Narrative Performed at:  [address] [place] [address]  [id number] : Shekhar Flood PhD, Phone:  [phone] CKD REPORT Result Value Ref Range Interpretation Note Comment:  
   Supplemental report is available. Narrative Performed at:  [address] [place] [address]  [id number] : Shekhar Flood PhD, Phone:  [phone] DIABETES PATIENT EDUCATION Result Value Ref Range PDF Image Not applicable Narrative Performed at:  [address] [place] [address]  [id number] : Shekhar Flood PhD, Phone:  [phone] Please call me if you have any questions: [phone] Sincerely, 
 
 
Lima Leach PA-C

## 2018-03-20 LAB
ALBUMIN SERPL-MCNC: 4.4 G/DL (ref 3.5–5.5)
ALBUMIN/CREAT UR: 4.7 MG/G CREAT (ref 0–30)
ALBUMIN/GLOB SERPL: 1.1 {RATIO} (ref 1.2–2.2)
ALP SERPL-CCNC: 75 IU/L (ref 39–117)
ALT SERPL-CCNC: 21 IU/L (ref 0–44)
AST SERPL-CCNC: 24 IU/L (ref 0–40)
BILIRUB SERPL-MCNC: 1 MG/DL (ref 0–1.2)
BUN SERPL-MCNC: 25 MG/DL (ref 6–24)
BUN/CREAT SERPL: 18 (ref 9–20)
CALCIUM SERPL-MCNC: 9.6 MG/DL (ref 8.7–10.2)
CHLORIDE SERPL-SCNC: 96 MMOL/L (ref 96–106)
CHOLEST SERPL-MCNC: 127 MG/DL (ref 100–199)
CO2 SERPL-SCNC: 30 MMOL/L (ref 18–29)
CREAT SERPL-MCNC: 1.42 MG/DL (ref 0.76–1.27)
CREAT UR-MCNC: 66.3 MG/DL
EST. AVERAGE GLUCOSE BLD GHB EST-MCNC: 166 MG/DL
GFR SERPLBLD CREATININE-BSD FMLA CKD-EPI: 55 ML/MIN/1.73
GFR SERPLBLD CREATININE-BSD FMLA CKD-EPI: 64 ML/MIN/1.73
GLOBULIN SER CALC-MCNC: 3.9 G/DL (ref 1.5–4.5)
GLUCOSE SERPL-MCNC: 115 MG/DL (ref 65–99)
HBA1C MFR BLD: 7.4 % (ref 4.8–5.6)
HDLC SERPL-MCNC: 33 MG/DL
INTERPRETATION, 910389: NORMAL
INTERPRETATION: NORMAL
LDLC SERPL CALC-MCNC: 59 MG/DL (ref 0–99)
Lab: NORMAL
MICROALBUMIN UR-MCNC: 3.1 UG/ML
PDF IMAGE, 910387: NORMAL
POTASSIUM SERPL-SCNC: 3.6 MMOL/L (ref 3.5–5.2)
PROT SERPL-MCNC: 8.3 G/DL (ref 6–8.5)
SODIUM SERPL-SCNC: 142 MMOL/L (ref 134–144)
TRIGL SERPL-MCNC: 175 MG/DL (ref 0–149)
VLDLC SERPL CALC-MCNC: 35 MG/DL (ref 5–40)

## 2018-03-20 NOTE — PROGRESS NOTES
Please let the patient know his kidney function improved a little. His hemoglobin a1c is elevated this time at 7.4. (the patient is expecting this because he had not been watching his diet) triglycerides are a little elevated this time as well. Encourage the patient to get back on schedule with his diet and exercise.  Make him a follow up in 3-4 months with Dr. Chikis Dahl or Jose Manuel Chang thanks

## 2018-03-28 DIAGNOSIS — E11.21 TYPE 2 DIABETES MELLITUS WITH DIABETIC NEPHROPATHY, WITHOUT LONG-TERM CURRENT USE OF INSULIN (HCC): ICD-10-CM

## 2018-03-28 DIAGNOSIS — I10 ESSENTIAL HYPERTENSION: ICD-10-CM

## 2018-03-29 RX ORDER — GLIMEPIRIDE 4 MG/1
4 TABLET ORAL
Qty: 90 TAB | Refills: 1 | Status: SHIPPED | OUTPATIENT
Start: 2018-03-29 | End: 2018-09-21 | Stop reason: SDUPTHER

## 2018-03-29 RX ORDER — AMLODIPINE BESYLATE 10 MG/1
10 TABLET ORAL DAILY
Qty: 90 TAB | Refills: 1 | Status: SHIPPED | OUTPATIENT
Start: 2018-03-29 | End: 2018-09-21 | Stop reason: SDUPTHER

## 2018-03-29 RX ORDER — ATENOLOL AND CHLORTHALIDONE TABLET 50; 25 MG/1; MG/1
1 TABLET ORAL DAILY
Qty: 90 TAB | Refills: 1 | Status: SHIPPED | OUTPATIENT
Start: 2018-03-29 | End: 2018-09-21 | Stop reason: SDUPTHER

## 2018-03-29 RX ORDER — BENAZEPRIL HYDROCHLORIDE 40 MG/1
40 TABLET ORAL DAILY
Qty: 90 TAB | Refills: 1 | Status: SHIPPED | OUTPATIENT
Start: 2018-03-29 | End: 2018-09-21 | Stop reason: SDUPTHER

## 2018-09-21 ENCOUNTER — OFFICE VISIT (OUTPATIENT)
Dept: INTERNAL MEDICINE CLINIC | Age: 58
End: 2018-09-21

## 2018-09-21 VITALS
SYSTOLIC BLOOD PRESSURE: 110 MMHG | HEIGHT: 66 IN | DIASTOLIC BLOOD PRESSURE: 79 MMHG | TEMPERATURE: 98.4 F | RESPIRATION RATE: 20 BRPM | OXYGEN SATURATION: 93 % | WEIGHT: 179 LBS | HEART RATE: 78 BPM | BODY MASS INDEX: 28.77 KG/M2

## 2018-09-21 DIAGNOSIS — Z23 ENCOUNTER FOR IMMUNIZATION: ICD-10-CM

## 2018-09-21 DIAGNOSIS — E78.00 PURE HYPERCHOLESTEROLEMIA: ICD-10-CM

## 2018-09-21 DIAGNOSIS — E78.5 HYPERLIPIDEMIA, UNSPECIFIED HYPERLIPIDEMIA TYPE: Primary | ICD-10-CM

## 2018-09-21 DIAGNOSIS — E11.9 TYPE 2 DIABETES MELLITUS WITHOUT COMPLICATION, WITHOUT LONG-TERM CURRENT USE OF INSULIN (HCC): ICD-10-CM

## 2018-09-21 DIAGNOSIS — Z12.5 PROSTATE CANCER SCREENING: ICD-10-CM

## 2018-09-21 DIAGNOSIS — E11.21 TYPE 2 DIABETES MELLITUS WITH DIABETIC NEPHROPATHY, WITHOUT LONG-TERM CURRENT USE OF INSULIN (HCC): ICD-10-CM

## 2018-09-21 DIAGNOSIS — Z12.11 COLON CANCER SCREENING: ICD-10-CM

## 2018-09-21 DIAGNOSIS — I10 ESSENTIAL HYPERTENSION: ICD-10-CM

## 2018-09-21 RX ORDER — GLIMEPIRIDE 4 MG/1
4 TABLET ORAL
Qty: 90 TAB | Refills: 1 | Status: SHIPPED | OUTPATIENT
Start: 2018-09-21 | End: 2019-04-10 | Stop reason: SDUPTHER

## 2018-09-21 RX ORDER — ATENOLOL AND CHLORTHALIDONE TABLET 50; 25 MG/1; MG/1
1 TABLET ORAL DAILY
Qty: 90 TAB | Refills: 1 | Status: SHIPPED | OUTPATIENT
Start: 2018-09-21 | End: 2019-04-10 | Stop reason: SDUPTHER

## 2018-09-21 RX ORDER — METFORMIN HYDROCHLORIDE 500 MG/1
TABLET, EXTENDED RELEASE ORAL
Qty: 360 TAB | Refills: 1 | Status: SHIPPED | OUTPATIENT
Start: 2018-09-21 | End: 2019-04-10 | Stop reason: SDUPTHER

## 2018-09-21 RX ORDER — ROSUVASTATIN CALCIUM 40 MG/1
TABLET, COATED ORAL
Qty: 90 TAB | Refills: 1 | Status: SHIPPED | OUTPATIENT
Start: 2018-09-21 | End: 2019-04-10 | Stop reason: SDUPTHER

## 2018-09-21 RX ORDER — BENAZEPRIL HYDROCHLORIDE 40 MG/1
40 TABLET ORAL DAILY
Qty: 90 TAB | Refills: 1 | Status: SHIPPED | OUTPATIENT
Start: 2018-09-21 | End: 2019-04-10 | Stop reason: SDUPTHER

## 2018-09-21 RX ORDER — AMLODIPINE BESYLATE 10 MG/1
10 TABLET ORAL DAILY
Qty: 90 TAB | Refills: 1 | Status: SHIPPED | OUTPATIENT
Start: 2018-09-21 | End: 2019-04-10 | Stop reason: SDUPTHER

## 2018-09-21 NOTE — MR AVS SNAPSHOT
[address] [place] [address] Drive [address] [place] [address] 
[phone] Patient: Garima Nagel MRN: [id number] :1960 Visit Information Date & Time Provider Department Dept. Phone Encounter #  
 2018 10:10 AM Pooja Gayle PA-C [facility] [phone] [id number] Upcoming Health Maintenance Date Due COLONOSCOPY 1980 Pneumococcal 19-64 Medium Risk (1 of 1 - PPSV23) 1981 DTaP/Tdap/Td series (1 - Tdap) 1983 EYE EXAM RETINAL OR DILATED Q1 2015 Influenza Age 5 to Adult 2018 FOOT EXAM Q1 2018 HEMOGLOBIN A1C Q6M 2018 MICROALBUMIN Q1 3/19/2019 LIPID PANEL Q1 3/19/2019 Allergies as of 2018  Review Complete On: 3/19/2018 By: Pooja Gayle PA-C No Active Allergies Current Immunizations  Never Reviewed Name Date Influenza Vaccine (Quad) PF  Incomplete Not reviewed this visit You Were Diagnosed With   
  
 Codes Comments Hyperlipidemia, unspecified hyperlipidemia type    -  Primary ICD-10-CM: E78.5 ICD-9-CM: 272.4 Essential hypertension     ICD-10-CM: I10 
ICD-9-CM: 401.9 Type 2 diabetes mellitus without complication, without long-term current use of insulin (HCC)     ICD-10-CM: E11.9 ICD-9-CM: 250.00 Prostate cancer screening     ICD-10-CM: Z12.5 ICD-9-CM: V76.44 Colon cancer screening     ICD-10-CM: Z12.11 ICD-9-CM: V76.51 Encounter for immunization     ICD-10-CM: J21 ICD-9-CM: V03.89 Vitals BP Pulse Temp Resp Height(growth percentile) Weight(growth percentile) 110/79 78 98.4 °F (36.9 °C) (Oral) 20 5' 6\" (1.676 m) 179 lb (81.2 kg) SpO2 BMI Smoking Status 93% 28.89 kg/m2 Never Smoker Vitals History BMI and BSA Data Body Mass Index Body Surface Area  
 28.89 kg/m 2 1.94 m 2 Preferred Pharmacy Pharmacy Name Phone [address] [place] [address] Rosemary Estrada [phone] Your Updated Medication List  
  
   
This list is accurate as of 9/21/18 11:05 AM.  Always use your most recent med list. amLODIPine 10 mg tablet Commonly known as:  Voncile Cole Camp Take 1 Tab by mouth daily. aspirin delayed-release 81 mg tablet Take  by mouth daily as needed for Pain. atenolol-chlorthalidone 50-25 mg per tablet Commonly known as:  TENORETIC 50 Take 1 Tab by mouth daily. benazepril 40 mg tablet Commonly known as:  LOTENSIN Take 1 Tab by mouth daily. empagliflozin 25 mg tablet Commonly known as:  Endy Min Take 1 Tab by mouth daily. glimepiride 4 mg tablet Commonly known as:  AMARYL Take 1 Tab by mouth Daily (before breakfast). metFORMIN  mg tablet Commonly known as:  GLUCOPHAGE XR  
TAKE 4 TABLETS BY MOUTH EVERY DAY WITH  DINNER  
  
 rosuvastatin 40 mg tablet Commonly known as:  CRESTOR  
TAKE 1 TABLET BY MOUTH NIGHTLY  
  
 sildenafil citrate 100 mg tablet Commonly known as:  VIAGRA Take 1 Tab by mouth as needed. We Performed the Following CBC WITH AUTOMATED DIFF [52294 CPT(R)] HEMOGLOBIN A1C WITH EAG [15628 CPT(R)] INFLUENZA VIRUS VAC QUAD,SPLIT,PRESV FREE SYRINGE IM [id number] CPT(R)] LIPID PANEL [35692 CPT(R)] METABOLIC PANEL, COMPREHENSIVE [54689 CPT(R)] PSA, DIAGNOSTIC (PROSTATE SPECIFIC AG) [id number] CPT(R)] REFERRAL TO GASTROENTEROLOGY [RYR04 Custom] Referral Information Referral ID Referred By Referred To  
  
 [id number] Radha TORRES MD   
   [address] [place] [address] Phone: [phone] Fax: [phone] Visits Status Start Date End Date 1 Authorized 9/21/18 9/21/19 If your referral has a status of pending review or denied, additional information will be sent to support the outcome of this decision. Please provide this summary of care documentation to your next provider. Your primary care clinician is listed as [id number] [address] [place] [address] [address]. If you have any questions after today's visit, please call [phone].

## 2018-09-21 NOTE — PROGRESS NOTES
Chief Complaint   Patient presents with    Blood sugar problem     needs bloodwork included PSA, needs another order for colonoscopy, needs refills    Blood Pressure Check    Cholesterol Problem       Reviewed record in preparation for visit and have obtained necessary documentation. Identified pt with two pt identifiers(name and ). Health Maintenance Due   Topic    COLONOSCOPY     Pneumococcal 19-64 Medium Risk (1 of 1 - PPSV23)    DTaP/Tdap/Td series (1 - Tdap)    EYE EXAM RETINAL OR DILATED Q1     Influenza Age 5 to Adult     FOOT EXAM Q1     HEMOGLOBIN A1C Q6M          Chief Complaint   Patient presents with    Blood sugar problem     needs bloodwork included PSA, needs another order for colonoscopy, needs refills    Blood Pressure Check    Cholesterol Problem        Wt Readings from Last 3 Encounters:   18 179 lb (81.2 kg)   18 183 lb (83 kg)   17 183 lb 6.4 oz (83.2 kg)     Temp Readings from Last 3 Encounters:   18 98.4 °F (36.9 °C) (Oral)   18 98.4 °F (36.9 °C) (Oral)   17 98.1 °F (36.7 °C) (Oral)     BP Readings from Last 3 Encounters:   18 110/79   18 114/72   17 111/86     Pulse Readings from Last 3 Encounters:   18 78   18 73   17 73           Learning Assessment:  :     Learning Assessment 2014   PRIMARY LEARNER Patient   PRIMARY LANGUAGE ENGLISH   LEARNER PREFERENCE PRIMARY READING     DEMONSTRATION     VIDEOS   ANSWERED BY patient   RELATIONSHIP SELF       Depression Screening:  :     PHQ over the last two weeks 2018   Little interest or pleasure in doing things Not at all   Feeling down, depressed, irritable, or hopeless Not at all   Total Score PHQ 2 0       Fall Risk Assessment:  :     No flowsheet data found. Abuse Screening:  :     Abuse Screening Questionnaire 2017   Do you ever feel afraid of your partner?  N   Are you in a relationship with someone who physically or mentally threatens you? N   Is it safe for you to go home? Y       Coordination of Care Questionnaire:  :     1) Have you been to an emergency room, urgent care clinic since your last visit? no   Hospitalized since your last visit? no             2) Have you seen or consulted any other health care providers outside of [address] [place] [address] since your last visit? no  (Include any pap smears or colon screenings in this section.)    3) Do you have an Advance Directive on file? no    4) Are you interested in receiving information on Advance Directives? NO      Patient is accompanied by self I have received verbal consent from Manuel Sheikh to discuss any/all medical information while they are present in the room.

## 2018-09-22 LAB
ALBUMIN SERPL-MCNC: 4.3 G/DL (ref 3.5–5.5)
ALBUMIN/GLOB SERPL: 1.1 {RATIO} (ref 1.2–2.2)
ALP SERPL-CCNC: 75 IU/L (ref 39–117)
ALT SERPL-CCNC: 21 IU/L (ref 0–44)
AST SERPL-CCNC: 17 IU/L (ref 0–40)
BASOPHILS # BLD AUTO: 0 X10E3/UL (ref 0–0.2)
BASOPHILS NFR BLD AUTO: 1 %
BILIRUB SERPL-MCNC: 1.3 MG/DL (ref 0–1.2)
BUN SERPL-MCNC: 22 MG/DL (ref 6–24)
BUN/CREAT SERPL: 16 (ref 9–20)
CALCIUM SERPL-MCNC: 9.7 MG/DL (ref 8.7–10.2)
CHLORIDE SERPL-SCNC: 96 MMOL/L (ref 96–106)
CHOLEST SERPL-MCNC: 220 MG/DL (ref 100–199)
CO2 SERPL-SCNC: 27 MMOL/L (ref 20–29)
CREAT SERPL-MCNC: 1.35 MG/DL (ref 0.76–1.27)
EOSINOPHIL # BLD AUTO: 0.1 X10E3/UL (ref 0–0.4)
EOSINOPHIL NFR BLD AUTO: 2 %
ERYTHROCYTE [DISTWIDTH] IN BLOOD BY AUTOMATED COUNT: 14.1 % (ref 12.3–15.4)
EST. AVERAGE GLUCOSE BLD GHB EST-MCNC: 163 MG/DL
GLOBULIN SER CALC-MCNC: 3.9 G/DL (ref 1.5–4.5)
GLUCOSE SERPL-MCNC: 124 MG/DL (ref 65–99)
HBA1C MFR BLD: 7.3 % (ref 4.8–5.6)
HCT VFR BLD AUTO: 51.6 % (ref 37.5–51)
HDLC SERPL-MCNC: 33 MG/DL
HGB BLD-MCNC: 17.9 G/DL (ref 13–17.7)
IMM GRANULOCYTES # BLD: 0 X10E3/UL (ref 0–0.1)
IMM GRANULOCYTES NFR BLD: 0 %
INTERPRETATION, 910389: NORMAL
INTERPRETATION: NORMAL
LDLC SERPL CALC-MCNC: 145 MG/DL (ref 0–99)
LYMPHOCYTES # BLD AUTO: 1.3 X10E3/UL (ref 0.7–3.1)
LYMPHOCYTES NFR BLD AUTO: 38 %
Lab: NORMAL
MCH RBC QN AUTO: 31 PG (ref 26.6–33)
MCHC RBC AUTO-ENTMCNC: 34.7 G/DL (ref 31.5–35.7)
MCV RBC AUTO: 89 FL (ref 79–97)
MONOCYTES # BLD AUTO: 0.4 X10E3/UL (ref 0.1–0.9)
MONOCYTES NFR BLD AUTO: 11 %
NEUTROPHILS # BLD AUTO: 1.7 X10E3/UL (ref 1.4–7)
NEUTROPHILS NFR BLD AUTO: 48 %
PDF IMAGE, 910387: NORMAL
PLATELET # BLD AUTO: 235 X10E3/UL (ref 150–379)
POTASSIUM SERPL-SCNC: 3.6 MMOL/L (ref 3.5–5.2)
PROT SERPL-MCNC: 8.2 G/DL (ref 6–8.5)
PSA SERPL-MCNC: 1.4 NG/ML (ref 0–4)
RBC # BLD AUTO: 5.77 X10E6/UL (ref 4.14–5.8)
SODIUM SERPL-SCNC: 142 MMOL/L (ref 134–144)
TRIGL SERPL-MCNC: 208 MG/DL (ref 0–149)
VLDLC SERPL CALC-MCNC: 42 MG/DL (ref 5–40)
WBC # BLD AUTO: 3.5 X10E3/UL (ref 3.4–10.8)

## 2018-09-24 NOTE — PROGRESS NOTES
Please let the patient know his creatinine is still a little elevated but better. Do not take NSAIDS. Cholesterol is not controlled. Is he taking his cholesterol medication? Hemoglobin A1c is 7.3 compared to 7.4 the last time. Continue to watch carb intake and get regular exercise. PSA is within normal range but he is having a steady increase. This could be due to BPH (enlargement of the prostate) but I would like for him to consider seeing the urologist to confirm this.

## 2018-09-27 ENCOUNTER — TELEPHONE (OUTPATIENT)
Dept: INTERNAL MEDICINE CLINIC | Age: 58
End: 2018-09-27

## 2018-09-27 DIAGNOSIS — R97.20 PSA ELEVATION: Primary | ICD-10-CM

## 2019-02-07 ENCOUNTER — TELEPHONE (OUTPATIENT)
Dept: INTERNAL MEDICINE CLINIC | Age: 59
End: 2019-02-07

## 2019-02-07 NOTE — TELEPHONE ENCOUNTER
Please contact the patient and make him an appointment. He has not been seen since September.  thanks

## 2019-02-07 NOTE — TELEPHONE ENCOUNTER
Spoke with patient. Advised that the request for a medication refill was declined at this time. Office visit is needed.  States states will call back to schedule an appointment

## 2019-02-07 NOTE — TELEPHONE ENCOUNTER
Pt is requesting a refill on medications to be called into [facility] on file.  Best Contact: [phone]      Daysi Estrada

## 2019-04-10 ENCOUNTER — OFFICE VISIT (OUTPATIENT)
Dept: INTERNAL MEDICINE CLINIC | Age: 59
End: 2019-04-10

## 2019-04-10 VITALS
DIASTOLIC BLOOD PRESSURE: 69 MMHG | RESPIRATION RATE: 16 BRPM | HEIGHT: 66 IN | BODY MASS INDEX: 28.93 KG/M2 | SYSTOLIC BLOOD PRESSURE: 107 MMHG | OXYGEN SATURATION: 95 % | WEIGHT: 180 LBS | TEMPERATURE: 97.4 F | HEART RATE: 75 BPM

## 2019-04-10 DIAGNOSIS — Z12.11 COLON CANCER SCREENING: ICD-10-CM

## 2019-04-10 DIAGNOSIS — N18.30 CKD (CHRONIC KIDNEY DISEASE) STAGE 3, GFR 30-59 ML/MIN (HCC): ICD-10-CM

## 2019-04-10 DIAGNOSIS — Z00.00 ROUTINE ADULT HEALTH MAINTENANCE: Primary | ICD-10-CM

## 2019-04-10 DIAGNOSIS — E78.5 HYPERLIPIDEMIA ASSOCIATED WITH TYPE 2 DIABETES MELLITUS (HCC): ICD-10-CM

## 2019-04-10 DIAGNOSIS — I10 ESSENTIAL HYPERTENSION: ICD-10-CM

## 2019-04-10 DIAGNOSIS — N52.9 ERECTILE DYSFUNCTION, UNSPECIFIED ERECTILE DYSFUNCTION TYPE: ICD-10-CM

## 2019-04-10 DIAGNOSIS — E11.21 TYPE 2 DIABETES MELLITUS WITH DIABETIC NEPHROPATHY, WITHOUT LONG-TERM CURRENT USE OF INSULIN (HCC): ICD-10-CM

## 2019-04-10 DIAGNOSIS — E11.69 HYPERLIPIDEMIA ASSOCIATED WITH TYPE 2 DIABETES MELLITUS (HCC): ICD-10-CM

## 2019-04-10 PROBLEM — E78.00 PURE HYPERCHOLESTEROLEMIA: Status: RESOLVED | Noted: 2017-03-20 | Resolved: 2019-04-10

## 2019-04-10 RX ORDER — ATENOLOL AND CHLORTHALIDONE TABLET 50; 25 MG/1; MG/1
1 TABLET ORAL DAILY
Qty: 90 TAB | Refills: 0 | Status: SHIPPED | OUTPATIENT
Start: 2019-04-10 | End: 2019-07-03 | Stop reason: SDUPTHER

## 2019-04-10 RX ORDER — ROSUVASTATIN CALCIUM 40 MG/1
TABLET, COATED ORAL
Qty: 90 TAB | Refills: 0 | Status: SHIPPED | OUTPATIENT
Start: 2019-04-10 | End: 2019-07-03 | Stop reason: SDUPTHER

## 2019-04-10 RX ORDER — AMLODIPINE BESYLATE 10 MG/1
10 TABLET ORAL DAILY
Qty: 90 TAB | Refills: 0 | Status: SHIPPED | OUTPATIENT
Start: 2019-04-10 | End: 2019-07-03 | Stop reason: SDUPTHER

## 2019-04-10 RX ORDER — BENAZEPRIL HYDROCHLORIDE 40 MG/1
40 TABLET ORAL DAILY
Qty: 90 TAB | Refills: 0 | Status: SHIPPED | OUTPATIENT
Start: 2019-04-10 | End: 2019-07-03 | Stop reason: SDUPTHER

## 2019-04-10 RX ORDER — METFORMIN HYDROCHLORIDE 500 MG/1
TABLET, EXTENDED RELEASE ORAL
Qty: 360 TAB | Refills: 0 | Status: SHIPPED | OUTPATIENT
Start: 2019-04-10 | End: 2019-07-03 | Stop reason: SDUPTHER

## 2019-04-10 RX ORDER — GLIMEPIRIDE 4 MG/1
4 TABLET ORAL
Qty: 90 TAB | Refills: 0 | Status: SHIPPED | OUTPATIENT
Start: 2019-04-10 | End: 2019-07-03 | Stop reason: SDUPTHER

## 2019-04-10 NOTE — PROGRESS NOTES
Linette High is a 66 y.o. male who presents for evaluation of transfer of care. Used to see dr Sameer Wiley, last saw him sept 2017. He then saw a PA in that office in sept 2018. Overall doing well. Went to [facility], very excited about basketball team winning [employer] championship 2 days ago. Is going to parade and celebration on Saturday in [facility]. Asks about maybe cutting back on some meds. ROS:  Constitutional: negative for fevers, chills, anorexia and weight loss  Eyes:   negative for visual disturbance and irritation  ENT:   negative for tinnitus,sore throat,nasal congestion,ear pain,hoarseness  Respiratory:  negative for cough, hemoptysis, dyspnea,wheezing  CV:   negative for chest pain, palpitations, lower extremity edema  GI:   negative for nausea, vomiting, diarrhea, abdominal pain,melena  Genitourinary: negative for frequency, dysuria and hematuria  Musculoskel: negative for myalgias, arthralgias, back pain, muscle weakness, joint pain  Neurological:  negative for headaches, dizziness, focal weakness, numbness  Psychiatric:     Negative for depression or anxiety      Past Medical History:   Diagnosis Date    CKD (chronic kidney disease)     Diabetes ([facility] [address]     ED (erectile dysfunction)     Hypercholesterolemia     Hypertension        History reviewed. No pertinent surgical history. No family history on file. Social History     Socioeconomic History    Marital status:      Spouse name: Not on file    Number of children: Not on file    Years of education: Not on file    Highest education level: Not on file   Occupational History    Not on file   Social Needs    Financial resource strain: Not on file    Food insecurity:     Worry: Not on file     Inability: Not on file    Transportation needs:     Medical: Not on file     Non-medical: Not on file   Tobacco Use    Smoking status: Never Smoker    Smokeless tobacco: Never Used   Substance and Sexual Activity    Alcohol use:  No  Drug use: Not Currently    Sexual activity: Yes     Partners: Female     Comment: . one son. track star in hs. . Lifestyle    Physical activity:     Days per week: Not on file     Minutes per session: Not on file    Stress: Not on file   Relationships    Social connections:     Talks on phone: Not on file     Gets together: Not on file     Attends Hoahaoism service: Not on file     Active member of club or organization: Not on file     Attends meetings of clubs or organizations: Not on file     Relationship status: Not on file    Intimate partner violence:     Fear of current or ex partner: Not on file     Emotionally abused: Not on file     Physically abused: Not on file     Forced sexual activity: Not on file   Other Topics Concern    Not on file   Social History Narrative    Not on file            Visit Vitals  /69 (BP 1 Location: Right arm, BP Patient Position: Sitting)   Pulse 75   Temp 97.4 °F (36.3 °C) (Oral)   Resp 16   Ht 5' 6\" (1.676 m)   Wt 180 lb (81.6 kg)   SpO2 95%   BMI 29.05 kg/m²       Physical Examination:   General - Well appearing male  HEENT - PERRL, TM no erythema/opacification, normal nasal turbinates, no oropharyngeal erythema or exudate, MMM  Neck - supple, no bruits, no thyroidomegaly, no lymphadenopathy  Pulm - clear to auscultation bilaterally  Cardio - RRR, normal S1 S2, no murmur  Abd - soft, nontender, no masses, no HSM  Extrem - no edema, +2 distal pulses  Neuro-  No focal deficits, CN intact     Assessment/Plan:    1. Routine adult health maintenance--check cbc, cmp, tsh, psa  2. Dm, type 2--check a1c, urine micro. On glucophage, jardiance, amaryl. Consider switching to janumet and stopping amaryl  3.  htn--well controlled with norvasc, lotensin, atenolol/hctz. 4.  hyperlipids--check flp, on crestor  5.  ckd 3--check cmp  6. ED--check testosterone levels, has prn viagra    Referral to gi for screening colon.   rtc 6 months        Uzair Eason Fabi JOHNSTON, DO

## 2019-04-10 NOTE — PATIENT INSTRUCTIONS
Colon Cancer Screening: Care Instructions  Your Care Instructions    Colorectal cancer occurs in the colon or rectum. That's the lower part of your digestive system. It is the second-leading cause of cancer deaths in the [place]. It often starts with small growths called polyps in the colon or rectum. Polyps are usually found with screening tests. Depending on the type of test, any polyps found may be removed during the tests. Colorectal cancer usually does not cause symptoms at first. But regular tests can help find it early, before it spreads and becomes harder to treat. Experts advise routine tests for colon cancer for people starting at age 48. And they advise people with a higher risk of colon cancer to get tested sooner. Talk with your doctor about when you should start testing. Discuss which tests you need. Follow-up care is a key part of your treatment and safety. Be sure to make and go to all appointments, and call your doctor if you are having problems. It's also a good idea to know your test results and keep a list of the medicines you take. What are the main screening tests for colon cancer? · Stool tests. These include the fecal immunochemical test (FIT) and the fecal occult blood test (FOBT). These tests check stool samples for signs of cancer. If your test is positive, you will need to have a colonoscopy. · Sigmoidoscopy. This test lets your doctor look at the lining of your rectum and the lowest part of your colon. Your doctor uses a lighted tube called a sigmoidoscope. This test can't find cancers or polyps in the upper part of your colon. In some cases, polyps that are found can be removed. But if your doctor finds polyps, you will need to have a colonoscopy to check the upper part of your colon. · Colonoscopy. This test lets your doctor look at the lining of your rectum and your entire colon. The doctor uses a thin, flexible tool called a colonoscope.  It can also be used to remove polyps or get a tissue sample (biopsy). What tests do you need? The following guidelines are for people age 48 and over who are not at high risk for colorectal cancer. You may have at least one of these tests as directed by your doctor. · Fecal immunochemical test (FIT) or fecal occult blood test (FOBT) every year  · Sigmoidoscopy every 5 years  · Colonoscopy every 10 years  If you are age 68 to 80, you can work with your doctor to decide if screening is a good option. If you are age 80 or older, your doctor will likely advise that screening is not helpful. Talk with your doctor about when you need to be tested. And discuss which tests are right for you. Your doctor may recommend earlier or more frequent testing if you:  · Have had colorectal cancer before. · Have had colon polyps. · Have symptoms of colorectal cancer. These include blood in your stool and changes in your bowel habits. · Have a parent, brother or sister, or child with colon polyps or colorectal cancer. · Have a bowel disease. This includes ulcerative colitis and Crohn's disease. · Have a rare polyp syndrome that runs in families, such as familial adenomatous polyposis (FAP). · Have had radiation treatments to the belly or pelvis. When should you call for help? Watch closely for changes in your health, and be sure to contact your doctor if:    · You have any changes in your bowel habits.     · You have any problems. Where can you learn more? Go to http://amy-lobito.info/. Enter M541 in the search box to learn more about \"Colon Cancer Screening: Care Instructions. \"  Current as of: March 27, 2018  Content Version: 11.9  © 0841-5064 [employer]. Care instructions adapted under license by [employer] (which disclaims liability or warranty for this information).  If you have questions about a medical condition or this instruction, always ask your healthcare professional. [employer] disclaims any warranty or liability for your use of this information. Try to get colonoscopy done before next visit. Try to see eye doctor for retinal exam before next visit.

## 2019-04-10 NOTE — PROGRESS NOTES
Reviewed record in preparation for visit and have obtained necessary documentation. Identified pt with two pt identifiers(name and ). Chief Complaint   Patient presents with   [address] Maintenance Due   Topic Date Due    Pneumococcal 0-64 years (1 of 1 - PPSV23) 1968    COLONOSCOPY  1980    DTaP/Tdap/Td series (1 - Tdap) 1983    Shingrix Vaccine Age 50> (1 of 2) 2012    EYE EXAM RETINAL OR DILATED  2016    MICROALBUMIN Q1  2019    HEMOGLOBIN A1C Q6M  2019       Mr. Yvonne Thornton has a reminder for a \"due or due soon\" health maintenance. I have asked that he discuss health maintenance topic(s) due with His  primary care provider. Coordination of Care Questionnaire:  :     1) Have you been to an emergency room, urgent care clinic since your last visit? no   Hospitalized since your last visit? no             2) Have you seen or consulted any other health care providers outside of [address] [place] [address] since your last visit? no  (Include any pap smears or colon screenings in this section.)    3) Do you have an Advance Directive on file? no    4) Are you interested in receiving information on Advance Directives? NO    Patient is accompanied by self I have received verbal consent from Henrietta Patton to discuss any/all medical information while they are present in the room.

## 2019-04-11 LAB
ALBUMIN SERPL-MCNC: 4.6 G/DL (ref 3.5–5.5)
ALBUMIN/CREAT UR: 7.8 MG/G CREAT (ref 0–30)
ALBUMIN/GLOB SERPL: 1.3 {RATIO} (ref 1.2–2.2)
ALP SERPL-CCNC: 69 IU/L (ref 39–117)
ALT SERPL-CCNC: 21 IU/L (ref 0–44)
AST SERPL-CCNC: 15 IU/L (ref 0–40)
BASOPHILS # BLD AUTO: 0 X10E3/UL (ref 0–0.2)
BASOPHILS NFR BLD AUTO: 1 %
BILIRUB SERPL-MCNC: 0.8 MG/DL (ref 0–1.2)
BUN SERPL-MCNC: 27 MG/DL (ref 6–24)
BUN/CREAT SERPL: 19 (ref 9–20)
CALCIUM SERPL-MCNC: 9.9 MG/DL (ref 8.7–10.2)
CHLORIDE SERPL-SCNC: 97 MMOL/L (ref 96–106)
CHOLEST SERPL-MCNC: 146 MG/DL (ref 100–199)
CO2 SERPL-SCNC: 27 MMOL/L (ref 20–29)
CREAT SERPL-MCNC: 1.43 MG/DL (ref 0.76–1.27)
CREAT UR-MCNC: 71.6 MG/DL
EOSINOPHIL # BLD AUTO: 0.1 X10E3/UL (ref 0–0.4)
EOSINOPHIL NFR BLD AUTO: 3 %
ERYTHROCYTE [DISTWIDTH] IN BLOOD BY AUTOMATED COUNT: 14.4 % (ref 12.3–15.4)
EST. AVERAGE GLUCOSE BLD GHB EST-MCNC: 151 MG/DL
GLOBULIN SER CALC-MCNC: 3.6 G/DL (ref 1.5–4.5)
GLUCOSE SERPL-MCNC: 124 MG/DL (ref 65–99)
HBA1C MFR BLD: 6.9 % (ref 4.8–5.6)
HCT VFR BLD AUTO: 52.2 % (ref 37.5–51)
HDLC SERPL-MCNC: 32 MG/DL
HGB BLD-MCNC: 17.6 G/DL (ref 13–17.7)
IMM GRANULOCYTES # BLD AUTO: 0 X10E3/UL (ref 0–0.1)
IMM GRANULOCYTES NFR BLD AUTO: 0 %
LDLC SERPL CALC-MCNC: 67 MG/DL (ref 0–99)
LYMPHOCYTES # BLD AUTO: 1.4 X10E3/UL (ref 0.7–3.1)
LYMPHOCYTES NFR BLD AUTO: 39 %
MCH RBC QN AUTO: 31 PG (ref 26.6–33)
MCHC RBC AUTO-ENTMCNC: 33.7 G/DL (ref 31.5–35.7)
MCV RBC AUTO: 92 FL (ref 79–97)
MICROALBUMIN UR-MCNC: 5.6 UG/ML
MONOCYTES # BLD AUTO: 0.3 X10E3/UL (ref 0.1–0.9)
MONOCYTES NFR BLD AUTO: 9 %
NEUTROPHILS # BLD AUTO: 1.8 X10E3/UL (ref 1.4–7)
NEUTROPHILS NFR BLD AUTO: 48 %
PLATELET # BLD AUTO: 227 X10E3/UL (ref 150–379)
POTASSIUM SERPL-SCNC: 3.6 MMOL/L (ref 3.5–5.2)
PROT SERPL-MCNC: 8.2 G/DL (ref 6–8.5)
PSA SERPL-MCNC: 1.2 NG/ML (ref 0–4)
RBC # BLD AUTO: 5.68 X10E6/UL (ref 4.14–5.8)
SODIUM SERPL-SCNC: 143 MMOL/L (ref 134–144)
TESTOST FREE SERPL-MCNC: 9.8 PG/ML (ref 7.2–24)
TESTOST SERPL-MCNC: 365 NG/DL (ref 264–916)
TRIGL SERPL-MCNC: 233 MG/DL (ref 0–149)
TSH SERPL DL<=0.005 MIU/L-ACNC: 2.77 UIU/ML (ref 0.45–4.5)
VLDLC SERPL CALC-MCNC: 47 MG/DL (ref 5–40)
WBC # BLD AUTO: 3.7 X10E3/UL (ref 3.4–10.8)

## 2019-07-03 ENCOUNTER — OFFICE VISIT (OUTPATIENT)
Dept: INTERNAL MEDICINE CLINIC | Age: 59
End: 2019-07-03

## 2019-07-03 VITALS
BODY MASS INDEX: 27.64 KG/M2 | HEART RATE: 74 BPM | RESPIRATION RATE: 16 BRPM | HEIGHT: 66 IN | DIASTOLIC BLOOD PRESSURE: 77 MMHG | SYSTOLIC BLOOD PRESSURE: 113 MMHG | OXYGEN SATURATION: 96 % | WEIGHT: 172 LBS | TEMPERATURE: 98.3 F

## 2019-07-03 DIAGNOSIS — Z23 ENCOUNTER FOR IMMUNIZATION: Primary | ICD-10-CM

## 2019-07-03 DIAGNOSIS — N18.30 CKD (CHRONIC KIDNEY DISEASE) STAGE 3, GFR 30-59 ML/MIN (HCC): ICD-10-CM

## 2019-07-03 DIAGNOSIS — E11.69 HYPERLIPIDEMIA ASSOCIATED WITH TYPE 2 DIABETES MELLITUS (HCC): ICD-10-CM

## 2019-07-03 DIAGNOSIS — I10 ESSENTIAL HYPERTENSION: ICD-10-CM

## 2019-07-03 DIAGNOSIS — E78.5 HYPERLIPIDEMIA ASSOCIATED WITH TYPE 2 DIABETES MELLITUS (HCC): ICD-10-CM

## 2019-07-03 DIAGNOSIS — E11.21 TYPE 2 DIABETES MELLITUS WITH DIABETIC NEPHROPATHY, WITHOUT LONG-TERM CURRENT USE OF INSULIN (HCC): ICD-10-CM

## 2019-07-03 RX ORDER — ATENOLOL AND CHLORTHALIDONE TABLET 50; 25 MG/1; MG/1
1 TABLET ORAL DAILY
Qty: 90 TAB | Refills: 3 | Status: SHIPPED | OUTPATIENT
Start: 2019-07-03 | End: 2019-11-11

## 2019-07-03 RX ORDER — ROSUVASTATIN CALCIUM 40 MG/1
TABLET, COATED ORAL
Qty: 90 TAB | Refills: 3 | Status: SHIPPED | OUTPATIENT
Start: 2019-07-03 | End: 2019-11-11 | Stop reason: SDUPTHER

## 2019-07-03 RX ORDER — BENAZEPRIL HYDROCHLORIDE 40 MG/1
40 TABLET ORAL DAILY
Qty: 90 TAB | Refills: 3 | Status: SHIPPED | OUTPATIENT
Start: 2019-07-03 | End: 2019-11-11 | Stop reason: SDUPTHER

## 2019-07-03 RX ORDER — GLIMEPIRIDE 4 MG/1
4 TABLET ORAL
Qty: 90 TAB | Refills: 3 | Status: SHIPPED | OUTPATIENT
Start: 2019-07-03 | End: 2019-11-11 | Stop reason: SDUPTHER

## 2019-07-03 RX ORDER — METFORMIN HYDROCHLORIDE 500 MG/1
TABLET, EXTENDED RELEASE ORAL
Qty: 360 TAB | Refills: 3 | Status: SHIPPED | OUTPATIENT
Start: 2019-07-03 | End: 2019-11-11 | Stop reason: SDUPTHER

## 2019-07-03 RX ORDER — AMLODIPINE BESYLATE 10 MG/1
10 TABLET ORAL DAILY
Qty: 90 TAB | Refills: 3 | Status: SHIPPED | OUTPATIENT
Start: 2019-07-03 | End: 2019-11-11 | Stop reason: SDUPTHER

## 2019-07-03 NOTE — PROGRESS NOTES
Reviewed record in preparation for visit and have obtained necessary documentation. Identified pt with two pt identifiers(name and ). Chief Complaint   Patient presents with    Hypertension     3 month follow up    Diabetes       Health Maintenance Due   Topic Date Due    Pneumococcal 0-64 years (1 of 1 - PPSV23) 1968    DTaP/Tdap/Td series (1 - Tdap) 1983    Shingrix Vaccine Age 50> (1 of 2) 2012    EYE EXAM RETINAL OR DILATED  2016       Mr. Yanelis Okeefe has a reminder for a \"due or due soon\" health maintenance. I have asked that he discuss health maintenance topic(s) due with His  primary care provider. Coordination of Care Questionnaire:  :     1) Have you been to an emergency room, urgent care clinic since your last visit? no   Hospitalized since your last visit? no             2) Have you seen or consulted any other health care providers outside of [address] [place] [address] since your last visit? no  (Include any pap smears or colon screenings in this section.)    3) Do you have an Advance Directive on file? no    4) Are you interested in receiving information on Advance Directives? NO    Patient is accompanied by self I have received verbal consent from Alli Pepper to discuss any/all medical information while they are present in the room.

## 2019-07-03 NOTE — PATIENT INSTRUCTIONS
Recombinant Zoster (Shingles) Vaccine, RZV: What you need to know  Why get vaccinated? Shingles (also called herpes zoster, or just zoster) is a painful skin rash, often with blisters. Shingles is caused by the varicella zoster virus, the same virus that causes chickenpox. After you have chickenpox, the virus stays in your body and can cause shingles later in life. You can't catch shingles from another person. However, a person who has never had chickenpox (or chickenpox vaccine) could get chickenpox from someone with shingles. A shingles rash usually appears on one side of the face or body and heals within 2 to 4 weeks. Its main symptom is pain, which can be severe. Other symptoms can include fever, headache, chills, and upset stomach. Very rarely, a shingles infection can lead to pneumonia, hearing problems, blindness, brain inflammation (encephalitis), or death. For about 1 person in 5, severe pain can continue even long after the rash has cleared up. This long-lasting pain is called post-herpetic neuralgia (PHN). Shingles is far more common in people 48years of age and older than in younger people, and the risk increases with age. It is also more common in people whose immune system is weakened because of a disease such as cancer, or by drugs such as steroids or chemotherapy. At least 1 million people a year in the [facility] get shingles. Shingles vaccine (recombinant)  Recombinant shingles vaccine was approved by FDA in 2017 for the prevention of shingles. In clinical trials, it was more than 90% effective in preventing shingles. It can also reduce the likelihood of PHN. Two doses, 2 to 6 months apart, are recommended for adults 48 and older. This vaccine is also recommended for people who have already gotten the live shingles vaccine (Zostavax). There is no live virus in this vaccine.   Some people should not get this vaccine  Tell your vaccine provider if you:  · Have any severe, life-threatening allergies. A person who has ever had a life-threatening allergic reaction after a dose of recombinant shingles vaccine, or has a severe allergy to any component of this vaccine, may be advised not to be vaccinated. Ask your health care provider if you want information about vaccine components. · Are pregnant or breastfeeding. There is not much information about use of recombinant shingles vaccine in pregnant or nursing women. Your healthcare provider might recommend delaying vaccination. · Are not feeling well. If you have a mild illness, such as a cold, you can probably get the vaccine today. If you are moderately or severely ill, you should probably wait until you recover. Your doctor can advise you. Risks of a vaccine reaction  With any medicine, including vaccines, there is a chance of reactions. After recombinant shingles vaccination, a person might experience:  · Pain, redness, soreness, or swelling at the site of the injection  · Headache, muscle aches, fever, shivering, fatigue  In clinical trials, most people got a sore arm with mild or moderate pain after vaccination, and some also had redness and swelling where they got the shot. Some people felt tired, had muscle pain, a headache, shivering, fever, stomach pain, or nausea. About 1 out of 6 people who got recombinant zoster vaccine experienced side effects that prevented them from doing regular activities. Symptoms went away on their own in about 2 to 3 days. Side effects were more common in younger people. You should still get the second dose of recombinant zoster vaccine even if you had one of these reactions after the first dose. Other things that could happen after this vaccine:  · People sometimes faint after medical procedures, including vaccination. Sitting or lying down for about 15 minutes can help prevent fainting and injuries caused by a fall.  Tell your provider if you feel dizzy or have vision changes or ringing in the ears.  · Some people get shoulder pain that can be more severe and longer-lasting than routine soreness that can follow injections. This happens very rarely. · Any medication can cause a severe allergic reaction. Such reactions to a vaccine are estimated at about 1 in a million doses, and would happen within a few minutes to a few hours after the vaccination. As with any medicine, there is a very remote chance of a vaccine causing a serious injury or death. The safety of vaccines is always being monitored. For more information, visit: www.cdc.gov/vaccinesafety/  What if there is a serious problem? What should I look for? · Look for anything that concerns you, such as signs of a severe allergic reaction, very high fever, or unusual behavior. Signs of a severe allergic reaction can include hives, swelling of the face and throat, difficulty breathing, a fast heartbeat, dizziness, and weakness. These would usually start a few minutes to a few hours after the vaccination. What should I do? · If you think it is a severe allergic reaction or other emergency that can't wait, call 9-1-1 or get to the nearest hospital. Otherwise, call your health care provider. · Afterward, the reaction should be reported to the Vaccine Adverse Event Reporting System (VAERS). Your doctor should file this report, or you can do it yourself through the VAERS website at www.vaers. hhs.gov, or by calling 5-666.161.4809. VAERS does not give medical advice. .  How can I learn more? · Ask your health care provider. He or she can give you the vaccine package insert or suggest other sources of information. · Call your local or state health department. · Contact the Centers for Disease Control and Prevention (CDC):  ? Call 7-108.354.4986 (1-800-CDC-INFO) or  ?  Visit CDC's website at www.cdc.gov/vaccines  Vaccine Information Statement (Interim)  Recombinant Zoster Vaccine  2/12/2018  [facility] Control and Prevention  Many Vaccine Information Statements are available in [place] and other languages. See www.immunize.org/vis. Hojas de Información Sobre Vacunas están disponibles en Español y en muchos otros idiomas. Visite Eli.si   Care instructions adapted under license by [employer] (which disclaims liability or warranty for this information). If you have questions about a medical condition or this instruction, always ask your healthcare professional. [address] any warranty or liability for your use of this information.

## 2019-07-03 NOTE — PROGRESS NOTES
Jen Toure is a 66 y.o. male who presents for evaluation of routine follow up. Last seen by me April 10, 2019. Doing well, no concerns. Weight down 8 lbs. Had colonoscopy, good for 10 years. Had eye exam, dr Karli Bello. ROS:  Constitutional: negative for fevers, chills, anorexia and weight loss  Eyes:   negative for visual disturbance and irritation  ENT:   negative for tinnitus,sore throat,nasal congestion,ear pain,hoarseness  Respiratory:  negative for cough, hemoptysis, dyspnea,wheezing  CV:   negative for chest pain, palpitations, lower extremity edema  GI:   negative for nausea, vomiting, diarrhea, abdominal pain,melena  Genitourinary: negative for frequency, dysuria and hematuria  Musculoskel: negative for myalgias, arthralgias, back pain, muscle weakness, joint pain  Neurological:  negative for headaches, dizziness, focal weakness, numbness  Psychiatric:     Negative for depression or anxiety      Past Medical History:   Diagnosis Date    CKD (chronic kidney disease)     Diabetes ([facility] [address]     ED (erectile dysfunction)     Hypercholesterolemia     Hypertension        History reviewed. No pertinent surgical history. History reviewed. No pertinent family history. Social History     Socioeconomic History    Marital status:      Spouse name: Not on file    Number of children: Not on file    Years of education: Not on file    Highest education level: Not on file   Occupational History    Not on file   Social Needs    Financial resource strain: Not on file    Food insecurity:     Worry: Not on file     Inability: Not on file    Transportation needs:     Medical: Not on file     Non-medical: Not on file   Tobacco Use    Smoking status: Never Smoker    Smokeless tobacco: Never Used   Substance and Sexual Activity    Alcohol use: No    Drug use: Not Currently    Sexual activity: Yes     Partners: Female     Comment: . one son. track star in [employer]. .    Lifestyle    Physical activity:     Days per week: Not on file     Minutes per session: Not on file    Stress: Not on file   Relationships    Social connections:     Talks on phone: Not on file     Gets together: Not on file     Attends Rastafarian service: Not on file     Active member of club or organization: Not on file     Attends meetings of clubs or organizations: Not on file     Relationship status: Not on file    Intimate partner violence:     Fear of current or ex partner: Not on file     Emotionally abused: Not on file     Physically abused: Not on file     Forced sexual activity: Not on file   Other Topics Concern    Not on file   Social History Narrative    Not on file            Visit Vitals  /77 (BP 1 Location: Left arm, BP Patient Position: Sitting)   Pulse 74   Temp 98.3 °F (36.8 °C) (Oral)   Resp 16   Ht 5' 6\" (1.676 m)   Wt 172 lb (78 kg)   SpO2 96%   BMI 27.76 kg/m²       Physical Examination:   General - Well appearing male  HEENT - PERRL, TM no erythema/opacification, normal nasal turbinates, no oropharyngeal erythema or exudate, MMM  Neck - supple, no bruits, no thyroidomegaly, no lymphadenopathy  Pulm - clear to auscultation bilaterally  Cardio - RRR, normal S1 S2, no murmur  Abd - soft, nontender, no masses, no HSM  Extrem - no edema, +2 distal pulses  Neuro-  No focal deficits, CN intact     Assessment/Plan:    1.  Dm, type 2--controlled with jardiance, amaryl, glucophage. Last a1c 6.9  2.  hyperlipids--on crestor, last LDL 67  3.  htn--controlled with norvasc, lotensin, atenolol/chlorthalidone  4.  ckd 3--stable    tdap given today. He wants to check vzv titers next visit, as he does not think he had chicken pox.         María Kong III, DO

## 2019-08-07 DIAGNOSIS — N52.9 ERECTILE DYSFUNCTION, UNSPECIFIED ERECTILE DYSFUNCTION TYPE: ICD-10-CM

## 2019-08-08 RX ORDER — SILDENAFIL 100 MG/1
100 TABLET, FILM COATED ORAL AS NEEDED
Qty: 24 TAB | Refills: 11 | Status: SHIPPED | OUTPATIENT
Start: 2019-08-08 | End: 2019-11-12 | Stop reason: SDUPTHER

## 2019-08-29 ENCOUNTER — TELEPHONE (OUTPATIENT)
Dept: INTERNAL MEDICINE CLINIC | Age: 59
End: 2019-08-29

## 2019-08-29 NOTE — TELEPHONE ENCOUNTER
[facility] Office Pool             Appointment not available     [facility] first and last name and relationship to patient (if not the patient): Mabel Lindsay     Best contact number:   [phone]       Preferred date and time:   Before he starts his new job on September 25th     Scheduled appointment date and time:   Monday, November 11, 2019 10:00 AM     Reason for appointment:   Deb Larkin f/up     Details to clarify the request:   Pt would like to schedule a sooner f/up appt before September 25, 2019. In the meantime pt is scheduled for November 11, 2019. Alysha WATTS        Message received & copied from [facility] after closing on 8/28/19

## 2019-08-29 NOTE — TELEPHONE ENCOUNTER
Patient states he's returning your call. Patient states he's content with his appt on Nov. 11 & no call back needed.  Thank you

## 2019-11-11 ENCOUNTER — OFFICE VISIT (OUTPATIENT)
Dept: INTERNAL MEDICINE CLINIC | Age: 59
End: 2019-11-11

## 2019-11-11 ENCOUNTER — TELEPHONE (OUTPATIENT)
Dept: INTERNAL MEDICINE CLINIC | Age: 59
End: 2019-11-11

## 2019-11-11 VITALS
RESPIRATION RATE: 16 BRPM | WEIGHT: 175 LBS | BODY MASS INDEX: 28.12 KG/M2 | OXYGEN SATURATION: 97 % | HEART RATE: 105 BPM | SYSTOLIC BLOOD PRESSURE: 130 MMHG | HEIGHT: 66 IN | DIASTOLIC BLOOD PRESSURE: 77 MMHG | TEMPERATURE: 98.3 F

## 2019-11-11 DIAGNOSIS — I10 ESSENTIAL HYPERTENSION: ICD-10-CM

## 2019-11-11 DIAGNOSIS — E11.69 HYPERLIPIDEMIA ASSOCIATED WITH TYPE 2 DIABETES MELLITUS (HCC): ICD-10-CM

## 2019-11-11 DIAGNOSIS — E11.21 TYPE 2 DIABETES MELLITUS WITH DIABETIC NEPHROPATHY, WITHOUT LONG-TERM CURRENT USE OF INSULIN (HCC): Primary | ICD-10-CM

## 2019-11-11 DIAGNOSIS — N52.8 OTHER MALE ERECTILE DYSFUNCTION: Primary | ICD-10-CM

## 2019-11-11 DIAGNOSIS — N52.9 ERECTILE DYSFUNCTION, UNSPECIFIED ERECTILE DYSFUNCTION TYPE: ICD-10-CM

## 2019-11-11 DIAGNOSIS — E78.5 HYPERLIPIDEMIA ASSOCIATED WITH TYPE 2 DIABETES MELLITUS (HCC): ICD-10-CM

## 2019-11-11 DIAGNOSIS — N18.30 CKD (CHRONIC KIDNEY DISEASE) STAGE 3, GFR 30-59 ML/MIN (HCC): ICD-10-CM

## 2019-11-11 LAB — HBA1C MFR BLD HPLC: 6.5 %

## 2019-11-11 RX ORDER — AMLODIPINE BESYLATE 10 MG/1
10 TABLET ORAL DAILY
Qty: 90 TAB | Refills: 3 | Status: SHIPPED | OUTPATIENT
Start: 2019-11-11 | End: 2019-11-11 | Stop reason: SDUPTHER

## 2019-11-11 RX ORDER — METFORMIN HYDROCHLORIDE 500 MG/1
TABLET, EXTENDED RELEASE ORAL
Qty: 360 TAB | Refills: 3 | Status: SHIPPED | OUTPATIENT
Start: 2019-11-11 | End: 2021-04-02

## 2019-11-11 RX ORDER — GLIMEPIRIDE 4 MG/1
4 TABLET ORAL
Qty: 90 TAB | Refills: 3 | Status: SHIPPED | OUTPATIENT
Start: 2019-11-11 | End: 2019-11-11 | Stop reason: SDUPTHER

## 2019-11-11 RX ORDER — ROSUVASTATIN CALCIUM 40 MG/1
TABLET, COATED ORAL
Qty: 90 TAB | Refills: 3 | Status: SHIPPED | OUTPATIENT
Start: 2019-11-11 | End: 2019-11-11 | Stop reason: SDUPTHER

## 2019-11-11 RX ORDER — AMLODIPINE BESYLATE 10 MG/1
10 TABLET ORAL DAILY
Qty: 90 TAB | Refills: 3 | Status: SHIPPED | OUTPATIENT
Start: 2019-11-11 | End: 2020-12-18 | Stop reason: SDUPTHER

## 2019-11-11 RX ORDER — GLIMEPIRIDE 4 MG/1
4 TABLET ORAL
Qty: 90 TAB | Refills: 3 | Status: SHIPPED | OUTPATIENT
Start: 2019-11-11 | End: 2020-10-01 | Stop reason: SDUPTHER

## 2019-11-11 RX ORDER — METFORMIN HYDROCHLORIDE 500 MG/1
TABLET, EXTENDED RELEASE ORAL
Qty: 360 TAB | Refills: 3 | Status: SHIPPED | OUTPATIENT
Start: 2019-11-11 | End: 2019-11-11 | Stop reason: SDUPTHER

## 2019-11-11 RX ORDER — BENAZEPRIL HYDROCHLORIDE 40 MG/1
40 TABLET ORAL DAILY
Qty: 90 TAB | Refills: 3 | Status: SHIPPED | OUTPATIENT
Start: 2019-11-11 | End: 2019-11-11 | Stop reason: SDUPTHER

## 2019-11-11 RX ORDER — ROSUVASTATIN CALCIUM 40 MG/1
TABLET, COATED ORAL
Qty: 90 TAB | Refills: 3 | Status: SHIPPED | OUTPATIENT
Start: 2019-11-11 | End: 2021-06-11 | Stop reason: SDUPTHER

## 2019-11-11 RX ORDER — BENAZEPRIL HYDROCHLORIDE 40 MG/1
40 TABLET ORAL DAILY
Qty: 90 TAB | Refills: 3 | Status: SHIPPED | OUTPATIENT
Start: 2019-11-11 | End: 2020-12-18 | Stop reason: SDUPTHER

## 2019-11-11 NOTE — PATIENT INSTRUCTIONS
Diabetes Foot Health: Care Instructions  Your Care Instructions    When you have diabetes, your feet need extra care and attention. Diabetes can damage the nerve endings and blood vessels in your feet, making you less likely to notice when your feet are injured. Diabetes also limits your body's ability to fight infection and get blood to areas that need it. If you get a minor foot injury, it could become an ulcer or a serious infection. With good foot care, you can prevent most of these problems. Caring for your feet can be quick and easy. Most of the care can be done when you are bathing or getting ready for bed. Follow-up care is a key part of your treatment and safety. Be sure to make and go to all appointments, and call your doctor if you are having problems. It's also a good idea to know your test results and keep a list of the medicines you take. How can you care for yourself at home? · Keep your blood sugar close to normal by watching what and how much you eat, monitoring blood sugar, taking medicines if prescribed, and getting regular exercise. · Do not smoke. Smoking affects blood flow and can make foot problems worse. If you need help quitting, talk to your doctor about stop-smoking programs and medicines. These can increase your chances of quitting for good. · Eat a diet that is low in fats. High fat intake can cause fat to build up in your blood vessels and decrease blood flow. · Inspect your feet daily for blisters, cuts, cracks, or sores. If you cannot see well, use a mirror or have someone help you. · Take care of your feet:  ? Wash your feet every day. Use warm (not hot) water. Check the water temperature with your wrists or other part of your body, not your feet. ? Dry your feet well. Pat them dry. Do not rub the skin on your feet too hard. Dry well between your toes. If the skin on your feet stays moist, bacteria or a fungus can grow, which can lead to infection. ?  Keep your skin soft. Use moisturizing skin cream to keep the skin on your feet soft and prevent calluses and cracks. But do not put the cream between your toes, and stop using any cream that causes a rash. ? Clean underneath your toenails carefully. Do not use a sharp object to clean underneath your toenails. Use the blunt end of a nail file or other rounded tool. ? Trim and file your toenails straight across to prevent ingrown toenails. Use a nail clipper, not scissors. Use an emery board to smooth the edges. · Change socks daily. Socks without seams are best, because seams often rub the feet. You can find socks for people with diabetes from specialty catalogs. · Look inside your shoes every day for things like gravel or torn linings, which could cause blisters or sores. · Buy shoes that fit well:  ? Look for shoes that have plenty of space around the toes. This helps prevent bunions and blisters. ? Try on shoes while wearing the kind of socks you will usually wear with the shoes. ? Avoid plastic shoes. They may rub your feet and cause blisters. Good shoes should be made of materials that are flexible and breathable, such as leather or cloth. ? Break in new shoes slowly by wearing them for no more than an hour a day for several days. Take extra time to check your feet for red areas, blisters, or other problems after you wear new shoes. · Do not go barefoot. Do not wear sandals, and do not wear shoes with very thin soles. Thin soles are easy to puncture. They also do not protect your feet from hot pavement or cold weather. · Have your doctor check your feet during each visit. If you have a foot problem, see your doctor. Do not try to treat an early foot problem at home. Home remedies or treatments that you can buy without a prescription (such as corn removers) can be harmful. · Always get early treatment for foot problems. A minor irritation can lead to a major problem if not properly cared for early.   When should you call for help? Call your doctor now or seek immediate medical care if:    · You have a foot sore, an ulcer or break in the skin that is not healing after 4 days, bleeding corns or calluses, or an ingrown toenail.     · You have blue or black areas, which can mean bruising or blood flow problems.     · You have peeling skin or tiny blisters between your toes or cracking or oozing of the skin.     · You have a fever for more than 24 hours and a foot sore.     · You have new numbness or tingling in your feet that does not go away after you move your feet or change positions.     · You have unexplained or unusual swelling of the foot or ankle.    Watch closely for changes in your health, and be sure to contact your doctor if:    · You cannot do proper foot care. Where can you learn more? Go to http://amy-lobito.info/. Enter A739 in the search box to learn more about \"Diabetes Foot Health: Care Instructions. \"  Current as of: April 16, 2019  Content Version: 12.2  © 3357-9517 Healthwise, Incorporated. Care instructions adapted under license by [employer] (which disclaims liability or warranty for this information). If you have questions about a medical condition or this instruction, always ask your healthcare professional. [address] any warranty or liability for your use of this information.

## 2019-11-11 NOTE — PROGRESS NOTES
Reviewed record in preparation for visit and have obtained necessary documentation. Identified pt with two pt identifiers(name and ). Chief Complaint   Patient presents with    Diabetes    Chronic Kidney Disease       Health Maintenance Due   Topic Date Due    Pneumococcal 0-64 years (1 of 3 - PCV13) 1968    Shingrix Vaccine Age 50> (1 of 2) 2012    EYE EXAM RETINAL OR DILATED  2016    Influenza Age 5 to Adult  2019    FOOT EXAM Q1  2019    HEMOGLOBIN A1C Q6M  10/10/2019       Mr. Citlaly Zavala has a reminder for a \"due or due soon\" health maintenance. I have asked that he discuss health maintenance topic(s) due with His  primary care provider. Coordination of Care Questionnaire:  :     1) Have you been to an emergency room, urgent care clinic since your last visit? no   Hospitalized since your last visit? no             2) Have you seen or consulted any other health care providers outside of [address] [place] [address] since your last visit? no  (Include any pap smears or colon screenings in this section.)    3) Do you have an Advance Directive on file? no    4) Are you interested in receiving information on Advance Directives? NO    Patient is accompanied by self I have received verbal consent from Amparo Florence to discuss any/all medical information while they are present in the room.

## 2019-11-11 NOTE — TELEPHONE ENCOUNTER
#[phone] pt states all his medications from today, 11-11-19 were sent to the incorrect pharmacy. Please send to the correct pharmacy that pt had asked you to. He states this was acknowledged by the nurse in the room. Pt has had problems with this nurse in the past and today. Pt believes this was deliberate and he wants a call from Dr. Joyner Flatten himself today.

## 2019-11-11 NOTE — PROGRESS NOTES
Markos Shea is a 66 y.o. male who presents for evaluation of routine follow up. Last seen by me July 3, 2019. Doing well. Has new job that is going well. Got a raise, and less stressful as well. Stopped his atenolol/chlorthalidone to see if that would help with his inability to get erections. Not much improvement yet. Follows bp at home, and typically 110-130/. ROS:  Constitutional: negative for fevers, chills, anorexia and weight loss  Eyes:   negative for visual disturbance and irritation  ENT:   negative for tinnitus,sore throat,nasal congestion,ear pain,hoarseness  Respiratory:  negative for cough, hemoptysis, dyspnea,wheezing  CV:   negative for chest pain, palpitations, lower extremity edema  GI:   negative for nausea, vomiting, diarrhea, abdominal pain,melena  Genitourinary: negative for frequency, dysuria and hematuria  Musculoskel: negative for myalgias, arthralgias, back pain, muscle weakness, joint pain  Neurological:  negative for headaches, dizziness, focal weakness, numbness  Psychiatric:     Negative for depression or anxiety      Past Medical History:   Diagnosis Date    CKD (chronic kidney disease)     Diabetes ([facility] [address]     ED (erectile dysfunction)     Hypercholesterolemia     Hypertension        History reviewed. No pertinent surgical history. History reviewed. No pertinent family history. Social History     Socioeconomic History    Marital status:      Spouse name: Not on file    Number of children: Not on file    Years of education: Not on file    Highest education level: Not on file   Occupational History    Not on file   Social Needs    Financial resource strain: Not on file    Food insecurity:     Worry: Not on file     Inability: Not on file    Transportation needs:     Medical: Not on file     Non-medical: Not on file   Tobacco Use    Smoking status: Never Smoker    Smokeless tobacco: Never Used   Substance and Sexual Activity    Alcohol use:  No  Drug use: Not Currently    Sexual activity: Yes     Partners: Female     Comment: . one son. track star in [employer]. .    Lifestyle    Physical activity:     Days per week: Not on file     Minutes per session: Not on file    Stress: Not on file   Relationships    Social connections:     Talks on phone: Not on file     Gets together: Not on file     Attends Yarsani service: Not on file     Active member of club or organization: Not on file     Attends meetings of clubs or organizations: Not on file     Relationship status: Not on file    Intimate partner violence:     Fear of current or ex partner: Not on file     Emotionally abused: Not on file     Physically abused: Not on file     Forced sexual activity: Not on file   Other Topics Concern    Not on file   Social History Narrative    Not on file            Visit Vitals  /77 (BP 1 Location: Left arm, BP Patient Position: Sitting)   Pulse (!) 105   Temp 98.3 °F (36.8 °C) (Oral)   Resp 16   Ht 5' 6\" (1.676 m)   Wt 175 lb (79.4 kg)   SpO2 97%   BMI 28.25 kg/m²       Physical Examination:   General - Well appearing male  HEENT - PERRL, TM no erythema/opacification, normal nasal turbinates, no oropharyngeal erythema or exudate, MMM  Neck - supple, no bruits, no thyroidomegaly, no lymphadenopathy  Pulm - clear to auscultation bilaterally  Cardio - RRR, normal S1 S2, no murmur  Abd - soft, nontender, no masses, no HSM  Extrem - no edema, +2 distal pulses  Neuro-  No focal deficits, CN intact         Diabetic foot exam performed by Cammie Pierre III, DO     Measurement  Response Nurse Comment Physician Comment   Monofilament  R - normal sensation with micro filament  L - normal sensation with micro filament     Pulse DP R - present  L - present     Pulse TP R - present  L - present     Structural deformity R - None  L - None     Skin Integrity / Deformity R - None  L - None        Reviewed by:              Assessment/Plan:    1.  Dm, type 2--on glucophage, amaryl, jardiance. a1c today down to 6.5  2.  htn--controlled with norvasc, lotensin  3.  ckd 3--has been stable  4.  hyperlipids--on crestor, last LDL 67  5. ED--has viagra. Stopped atenolol/chlorthalidone    Declines vaccines.   Had eye exam        Uvaldo Michaels III, DO

## 2019-11-12 DIAGNOSIS — N52.9 ERECTILE DYSFUNCTION, UNSPECIFIED ERECTILE DYSFUNCTION TYPE: ICD-10-CM

## 2019-11-12 RX ORDER — SILDENAFIL 100 MG/1
100 TABLET, FILM COATED ORAL AS NEEDED
Qty: 24 TAB | Refills: 11 | Status: SHIPPED | OUTPATIENT
Start: 2019-11-12 | End: 2021-04-02 | Stop reason: SDUPTHER

## 2019-11-12 NOTE — TELEPHONE ENCOUNTER
Caller's first and last name: Mariama Medina       Reason for call: complaint against Nurse Claire       Callback required yes/no and why: yes       Best contact number(s):[phone]       Details to clarify the request: Pt requested a return call regarding complaint against nurse Claire. Pt requested a return call from the doctor.  Pt also requested a Rx refill on generic Viagra 100 mgs,  24 tablets called into [address] @ [phone]   [facility]

## 2020-03-05 ENCOUNTER — TELEPHONE (OUTPATIENT)
Dept: INTERNAL MEDICINE CLINIC | Age: 60
End: 2020-03-05

## 2020-03-05 NOTE — TELEPHONE ENCOUNTER
----- Message from Colby Lux sent at 3/5/2020  4:23 PM EST -----  Regarding: Dr. Eileen Goodwin telephone  Appointment not available    Caller's first and last name and relationship to patient (if not the patient):      Best contact [phone]      Preferred date and time: anytime any day in march .  Pt advised that his insurance will laps at the lourdes of this month       Scheduled appointment date and time:none available       Reason for appointment:routine f/u      Message copied/pasted from Suzette

## 2020-03-06 NOTE — TELEPHONE ENCOUNTER
Called, spoke to pt. Two identifiers confirmed. CPE scheduled for 3/25 @ 11 with Dr. Anurag Loya.  Pt verbalized understanding of information discussed w/ no further questions at this time.

## 2020-03-25 ENCOUNTER — OFFICE VISIT (OUTPATIENT)
Dept: INTERNAL MEDICINE CLINIC | Age: 60
End: 2020-03-25

## 2020-03-25 VITALS
DIASTOLIC BLOOD PRESSURE: 79 MMHG | HEART RATE: 97 BPM | SYSTOLIC BLOOD PRESSURE: 133 MMHG | OXYGEN SATURATION: 97 % | BODY MASS INDEX: 27.74 KG/M2 | TEMPERATURE: 97.8 F | RESPIRATION RATE: 14 BRPM | WEIGHT: 172.6 LBS | HEIGHT: 66 IN

## 2020-03-25 DIAGNOSIS — Z00.00 ROUTINE ADULT HEALTH MAINTENANCE: Primary | ICD-10-CM

## 2020-03-25 DIAGNOSIS — R06.83 SNORES: ICD-10-CM

## 2020-03-25 DIAGNOSIS — I10 ESSENTIAL HYPERTENSION: ICD-10-CM

## 2020-03-25 DIAGNOSIS — E11.21 TYPE 2 DIABETES MELLITUS WITH DIABETIC NEPHROPATHY, WITHOUT LONG-TERM CURRENT USE OF INSULIN (HCC): ICD-10-CM

## 2020-03-25 DIAGNOSIS — E78.5 HYPERLIPIDEMIA ASSOCIATED WITH TYPE 2 DIABETES MELLITUS (HCC): ICD-10-CM

## 2020-03-25 DIAGNOSIS — E11.69 HYPERLIPIDEMIA ASSOCIATED WITH TYPE 2 DIABETES MELLITUS (HCC): ICD-10-CM

## 2020-03-25 DIAGNOSIS — N52.9 ERECTILE DYSFUNCTION, UNSPECIFIED ERECTILE DYSFUNCTION TYPE: ICD-10-CM

## 2020-03-25 DIAGNOSIS — N18.30 CKD (CHRONIC KIDNEY DISEASE) STAGE 3, GFR 30-59 ML/MIN (HCC): ICD-10-CM

## 2020-03-25 NOTE — PROGRESS NOTES
Reviewed record in preparation for visit and have obtained necessary documentation. Identified pt with two pt identifiers(name and ). Chief Complaint   Patient presents with    Diabetes       Health Maintenance Due   Topic Date Due    Pneumococcal 0-64 years (1 of 1 - PPSV23) 1968    Shingrix Vaccine Age 50> (1 of 2) 2012    Eye Exam Retinal or Dilated  2016    Influenza Age 5 to Adult  2019    MICROALBUMIN Q1  04/10/2020       Mr. Katiana Vivar has a reminder for a \"due or due soon\" health maintenance. I have asked that he discuss health maintenance topic(s) due with His  primary care provider. Coordination of Care Questionnaire:  :     1) Have you been to an emergency room, urgent care clinic since your last visit? no   Hospitalized since your last visit? no             2) Have you seen or consulted any other health care providers outside of [address] [place] [address] since your last visit? no  (Include any pap smears or colon screenings in this section.)    3) Do you have an Advance Directive on file? no    4) Are you interested in receiving information on Advance Directives? NO    Patient is accompanied by self I have received verbal consent from Serena Coyne to discuss any/all medical information while they are present in the room.

## 2020-03-25 NOTE — PROGRESS NOTES
Ann Howard is a 64 y.o. male who presents for evaluation of cpe. Last seen by me nov 11, 2019. Lost his job at [employer], ends march 31, so he wanted to be seen while he still has insurance. Overall doing ok. Stopped his atenolol/chlorthalidone before last visit due to sexual dysfunction, and his ED issues seem to have resolved. Also complains some now to n that he snores, and would like to see sleep doctor. ROS:  Constitutional: negative for fevers, chills, anorexia and weight loss  Eyes:   negative for visual disturbance and irritation  ENT:   negative for tinnitus,sore throat,nasal congestion,ear pain,hoarseness  Respiratory:  negative for cough, hemoptysis, dyspnea,wheezing  CV:   negative for chest pain, palpitations, lower extremity edema  GI:   negative for nausea, vomiting, diarrhea, abdominal pain,melena  Genitourinary: negative for frequency, dysuria and hematuria  Musculoskel: negative for myalgias, arthralgias, back pain, muscle weakness, joint pain  Neurological:  negative for headaches, dizziness, focal weakness, numbness  Psychiatric:     Negative for depression or anxiety      Past Medical History:   Diagnosis Date    CKD (chronic kidney disease)     Diabetes ([facility] [address]     ED (erectile dysfunction)     Hypercholesterolemia     Hypertension        History reviewed. No pertinent surgical history. History reviewed. No pertinent family history.     Social History     Socioeconomic History    Marital status:      Spouse name: Not on file    Number of children: Not on file    Years of education: Not on file    Highest education level: Not on file   Occupational History    Not on file   Social Needs    Financial resource strain: Not on file    Food insecurity     Worry: Not on file     Inability: Not on file    Transportation needs     Medical: Not on file     Non-medical: Not on file   Tobacco Use    Smoking status: Never Smoker    Smokeless tobacco: Never Used Substance and Sexual Activity    Alcohol use: No    Drug use: Not Currently    Sexual activity: Yes     Partners: Female     Comment: . one son. track star in [employer]. . Lifestyle    Physical activity     Days per week: Not on file     Minutes per session: Not on file    Stress: Not on file   Relationships    Social connections     Talks on phone: Not on file     Gets together: Not on file     Attends Shinto service: Not on file     Active member of club or organization: Not on file     Attends meetings of clubs or organizations: Not on file     Relationship status: Not on file    Intimate partner violence     Fear of current or ex partner: Not on file     Emotionally abused: Not on file     Physically abused: Not on file     Forced sexual activity: Not on file   Other Topics Concern    Not on file   Social History Narrative    Not on file            Visit Vitals  /79 (BP 1 Location: Left arm, BP Patient Position: Sitting)   Pulse 97   Temp 97.8 °F (36.6 °C) (Oral)   Resp 14   Ht 5' 6\" (1.676 m)   Wt 172 lb 9.6 oz (78.3 kg)   SpO2 97%   BMI 27.86 kg/m²       Physical Examination:   General - Well appearing male  HEENT - PERRL, TM no erythema/opacification, normal nasal turbinates, no oropharyngeal erythema or exudate, MMM  Neck - supple, no bruits, no thyroidomegaly, no lymphadenopathy  Pulm - clear to auscultation bilaterally  Cardio - RRR, normal S1 S2, no murmur  Abd - soft, nontender, no masses, no HSM  Extrem - no edema, +2 distal pulses  Neuro-  No focal deficits, CN intact     Assessment/Plan:    1. Routine adult health maintenance--check cbc, cmp, psa, tsh  2. htn--controlled with norvasc, lotensin  3.  Dm, type 2--on jardiance, amaryl, glucophage. Check a1c, urine micro  4.  hyperlipids--on crestor, check flp, cmp  5. ED--seems to have resolved after stopping atenolol/chlorthalidone  6. Snores--referral to sleep  Study  7.  ckd 3--check cmp    Declines vaccines.   rtc 6 tripp Buiton VICKIE, DO

## 2020-03-25 NOTE — PATIENT INSTRUCTIONS
Well Visit, Men 48 to 72: Care Instructions  Your Care Instructions    Physical exams can help you stay healthy. Your doctor has checked your overall health and may have suggested ways to take good care of yourself. He or she also may have recommended tests. At home, you can help prevent illness with healthy eating, regular exercise, and other steps. Follow-up care is a key part of your treatment and safety. Be sure to make and go to all appointments, and call your doctor if you are having problems. It's also a good idea to know your test results and keep a list of the medicines you take. How can you care for yourself at home? · Reach and stay at a healthy weight. This will lower your risk for many problems, such as obesity, diabetes, heart disease, and high blood pressure. · Get at least 30 minutes of exercise on most days of the week. Walking is a good choice. You also may want to do other activities, such as running, swimming, cycling, or playing tennis or team sports. · Do not smoke. Smoking can make health problems worse. If you need help quitting, talk to your doctor about stop-smoking programs and medicines. These can increase your chances of quitting for good. · Protect your skin from too much sun. When you're outdoors from 10 a.m. to 4 p.m., stay in the shade or cover up with clothing and a hat with a wide brim. Wear sunglasses that block UV rays. Even when it's cloudy, put broad-spectrum sunscreen (SPF 30 or higher) on any exposed skin. · See a dentist one or two times a year for checkups and to have your teeth cleaned. · Wear a seat belt in the car. Follow your doctor's advice about when to have certain tests. These tests can spot problems early. · Cholesterol. Your doctor will tell you how often to have this done based on your overall health and other things that can increase your risk for heart attack and stroke. · Blood pressure.  Have your blood pressure checked during a routine doctor visit. Your doctor will tell you how often to check your blood pressure based on your age, your blood pressure results, and other factors. · Prostate exam. Talk to your doctor about whether you should have a blood test (called a PSA test) for prostate cancer. Experts recommend that you discuss the benefits and risks of the test with your doctor before you decide whether to have this test.  · Diabetes. Ask your doctor whether you should have tests for diabetes. · Vision. Some experts recommend that you have yearly exams for glaucoma and other age-related eye problems starting at age 48. · Hearing. Tell your doctor if you notice any change in your hearing. You can have tests to find out how well you hear. · Colorectal cancer. Your risk for colorectal cancer gets higher as you get older. Some experts say that adults should start regular screening at age 48 and stop at age 76. Others say to start before age 48 or continue after age 76. Talk with your doctor about your risk and when to start and stop screening. · Heart attack and stroke risk. At least every 4 to 6 years, you should have your risk for heart attack and stroke assessed. Your doctor uses factors such as your age, blood pressure, cholesterol, and whether you smoke or have diabetes to show what your risk for a heart attack or stroke is over the next 10 years. · Abdominal aortic aneurysm. Ask your doctor whether you should have a test to check for an aneurysm. You may need a test if you ever smoked or if your parent, brother, sister, or child has had an aneurysm. When should you call for help? Watch closely for changes in your health, and be sure to contact your doctor if you have any problems or symptoms that concern you. Where can you learn more? Go to http://amy-lobito.info/  Enter N624 in the search box to learn more about \"Well Visit, Men 48 to 72: Care Instructions. \"  Current as of: August 21, 2019Content Version: 12.4  © 8912-0480 Healthwise, Incorporated. Care instructions adapted under license by [employer] (which disclaims liability or warranty for this information). If you have questions about a medical condition or this instruction, always ask your healthcare professional. [place] [address] any warranty or liability for your use of this information.

## 2020-03-26 LAB
ALBUMIN SERPL-MCNC: 4.6 G/DL (ref 3.8–4.9)
ALBUMIN/CREAT UR: 43 MG/G CREAT (ref 0–29)
ALBUMIN/GLOB SERPL: 1.4 {RATIO} (ref 1.2–2.2)
ALP SERPL-CCNC: 88 IU/L (ref 39–117)
ALT SERPL-CCNC: 17 IU/L (ref 0–44)
AST SERPL-CCNC: 13 IU/L (ref 0–40)
BASOPHILS # BLD AUTO: 0 X10E3/UL (ref 0–0.2)
BASOPHILS NFR BLD AUTO: 1 %
BILIRUB SERPL-MCNC: 0.9 MG/DL (ref 0–1.2)
BUN SERPL-MCNC: 15 MG/DL (ref 6–24)
BUN/CREAT SERPL: 14 (ref 9–20)
CALCIUM SERPL-MCNC: 9.7 MG/DL (ref 8.7–10.2)
CHLORIDE SERPL-SCNC: 103 MMOL/L (ref 96–106)
CHOLEST SERPL-MCNC: 119 MG/DL (ref 100–199)
CO2 SERPL-SCNC: 23 MMOL/L (ref 20–29)
CREAT SERPL-MCNC: 1.11 MG/DL (ref 0.76–1.27)
CREAT UR-MCNC: 107.9 MG/DL
EOSINOPHIL # BLD AUTO: 0.1 X10E3/UL (ref 0–0.4)
EOSINOPHIL NFR BLD AUTO: 3 %
ERYTHROCYTE [DISTWIDTH] IN BLOOD BY AUTOMATED COUNT: 13.2 % (ref 11.6–15.4)
EST. AVERAGE GLUCOSE BLD GHB EST-MCNC: 146 MG/DL
GLOBULIN SER CALC-MCNC: 3.3 G/DL (ref 1.5–4.5)
GLUCOSE SERPL-MCNC: 96 MG/DL (ref 65–99)
HBA1C MFR BLD: 6.7 % (ref 4.8–5.6)
HCT VFR BLD AUTO: 46.8 % (ref 37.5–51)
HDLC SERPL-MCNC: 35 MG/DL
HGB BLD-MCNC: 16.2 G/DL (ref 13–17.7)
IMM GRANULOCYTES # BLD AUTO: 0 X10E3/UL (ref 0–0.1)
IMM GRANULOCYTES NFR BLD AUTO: 0 %
LDLC SERPL CALC-MCNC: 62 MG/DL (ref 0–99)
LYMPHOCYTES # BLD AUTO: 1.2 X10E3/UL (ref 0.7–3.1)
LYMPHOCYTES NFR BLD AUTO: 28 %
MCH RBC QN AUTO: 30.5 PG (ref 26.6–33)
MCHC RBC AUTO-ENTMCNC: 34.6 G/DL (ref 31.5–35.7)
MCV RBC AUTO: 88 FL (ref 79–97)
MICROALBUMIN UR-MCNC: 46 UG/ML
MONOCYTES # BLD AUTO: 0.4 X10E3/UL (ref 0.1–0.9)
MONOCYTES NFR BLD AUTO: 10 %
NEUTROPHILS # BLD AUTO: 2.5 X10E3/UL (ref 1.4–7)
NEUTROPHILS NFR BLD AUTO: 58 %
PLATELET # BLD AUTO: 239 X10E3/UL (ref 150–450)
POTASSIUM SERPL-SCNC: 3.9 MMOL/L (ref 3.5–5.2)
PROT SERPL-MCNC: 7.9 G/DL (ref 6–8.5)
PSA SERPL-MCNC: 1 NG/ML (ref 0–4)
RBC # BLD AUTO: 5.32 X10E6/UL (ref 4.14–5.8)
SODIUM SERPL-SCNC: 143 MMOL/L (ref 134–144)
TRIGL SERPL-MCNC: 108 MG/DL (ref 0–149)
TSH SERPL DL<=0.005 MIU/L-ACNC: 4.1 UIU/ML (ref 0.45–4.5)
VLDLC SERPL CALC-MCNC: 22 MG/DL (ref 5–40)
WBC # BLD AUTO: 4.3 X10E3/UL (ref 3.4–10.8)

## 2020-03-26 NOTE — PROGRESS NOTES
Labs look good. Kidney function improved, dm improved as well. Continue with same meds. Good luck with job search.

## 2020-10-01 ENCOUNTER — TELEPHONE (OUTPATIENT)
Dept: INTERNAL MEDICINE CLINIC | Age: 60
End: 2020-10-01

## 2020-10-01 DIAGNOSIS — E11.21 TYPE 2 DIABETES MELLITUS WITH DIABETIC NEPHROPATHY, WITHOUT LONG-TERM CURRENT USE OF INSULIN (HCC): Primary | ICD-10-CM

## 2020-10-01 RX ORDER — GLIMEPIRIDE 4 MG/1
4 TABLET ORAL
Qty: 90 TAB | Refills: 3 | Status: SHIPPED | OUTPATIENT
Start: 2020-10-01 | End: 2020-12-18 | Stop reason: SDUPTHER

## 2020-10-01 NOTE — TELEPHONE ENCOUNTER
Patient states he needs a call back due to his Insurance Change & is having issues getting his refills done on medications,   Patient states his medication for Glimepiride (AMARYL) 4 mg tablet they only want to do 30 day supplies & he needs to know what to do as he normally has been getting 90 day with his other insurance. Patient states he is Also having trouble getting his Refill on his Empagliflozin (JARDIANCE) 25 mg tablet as [facility]acy[facility] on file is advising is requiring a Prior Auth. Please call to advise patient what he needs to do.  Thank you

## 2020-10-05 ENCOUNTER — TELEPHONE (OUTPATIENT)
Dept: INTERNAL MEDICINE CLINIC | Age: 60
End: 2020-10-05

## 2020-10-05 NOTE — TELEPHONE ENCOUNTER
Appointment scheduled for 10/26 @ 130 with dr. Flako Gutiérrez.  Pt verbalized understanding of information discussed w/ no further questions at this time. PA initiated for jardiance and approved.

## 2020-10-05 NOTE — TELEPHONE ENCOUNTER
[address] [place] [address] Office Pool               Caller's first and last name: Aristides Siddiqui   Reason for call: Pt is requesting a call back regarding his Jardiance refill. Gayathri Tobias is requiring a letter from the provider to authorize med in addition to pt's Metformin. Callback required yes/no and why: yes   Best contact number(s): [phone]   Details to clarify the request: 2nd request for a call back.      Copy/Paste ENVERA

## 2020-10-05 NOTE — TELEPHONE ENCOUNTER
----- Message from Yasmani Llamas sent at 10/5/2020 10:11 AM EDT -----  Regarding: Dr. Agus Francisco first and last name and relationship to patient (if not the patient): n/a  Best contact number: [phone]  Preferred date and time: first available in office  Scheduled appointment date and time: n/a  Reason for appointment: 6 month follow up      Message copied/pasted from Suzette

## 2020-10-27 ENCOUNTER — OFFICE VISIT (OUTPATIENT)
Dept: INTERNAL MEDICINE CLINIC | Age: 60
End: 2020-10-27
Payer: COMMERCIAL

## 2020-10-27 ENCOUNTER — TELEPHONE (OUTPATIENT)
Dept: INTERNAL MEDICINE CLINIC | Age: 60
End: 2020-10-27

## 2020-10-27 VITALS
TEMPERATURE: 97.2 F | WEIGHT: 178 LBS | BODY MASS INDEX: 28.61 KG/M2 | HEART RATE: 99 BPM | DIASTOLIC BLOOD PRESSURE: 86 MMHG | SYSTOLIC BLOOD PRESSURE: 125 MMHG | OXYGEN SATURATION: 96 % | HEIGHT: 66 IN | RESPIRATION RATE: 16 BRPM

## 2020-10-27 DIAGNOSIS — N18.30 STAGE 3 CHRONIC KIDNEY DISEASE, UNSPECIFIED WHETHER STAGE 3A OR 3B CKD (HCC): ICD-10-CM

## 2020-10-27 DIAGNOSIS — Z23 ENCOUNTER FOR IMMUNIZATION: ICD-10-CM

## 2020-10-27 DIAGNOSIS — Z20.2 POSSIBLE EXPOSURE TO STD: ICD-10-CM

## 2020-10-27 DIAGNOSIS — E11.21 TYPE 2 DIABETES MELLITUS WITH DIABETIC NEPHROPATHY, WITHOUT LONG-TERM CURRENT USE OF INSULIN (HCC): Primary | ICD-10-CM

## 2020-10-27 DIAGNOSIS — I10 ESSENTIAL HYPERTENSION: ICD-10-CM

## 2020-10-27 DIAGNOSIS — E11.69 HYPERLIPIDEMIA ASSOCIATED WITH TYPE 2 DIABETES MELLITUS (HCC): ICD-10-CM

## 2020-10-27 DIAGNOSIS — E78.5 HYPERLIPIDEMIA ASSOCIATED WITH TYPE 2 DIABETES MELLITUS (HCC): ICD-10-CM

## 2020-10-27 DIAGNOSIS — Z23 NEEDS FLU SHOT: ICD-10-CM

## 2020-10-27 PROCEDURE — 90472 IMMUNIZATION ADMIN EACH ADD: CPT

## 2020-10-27 PROCEDURE — 90471 IMMUNIZATION ADMIN: CPT

## 2020-10-27 PROCEDURE — 90750 HZV VACC RECOMBINANT IM: CPT

## 2020-10-27 PROCEDURE — 90686 IIV4 VACC NO PRSV 0.5 ML IM: CPT

## 2020-10-27 PROCEDURE — 99213 OFFICE O/P EST LOW 20 MIN: CPT

## 2020-10-27 NOTE — PATIENT INSTRUCTIONS
Learning How to Use a Male Condom  What is a male condom? Condoms can be used to prevent pregnancy. They can also help protect against sexually transmitted infections (STIs). You must use a new condom every time you have sex. Condoms prevent pregnancy by keeping sperm and eggs apart. The condom holds the sperm so the sperm can't get into the vagina. A male condom is a tube of soft rubber or plastic with a closed end. It fits over the penis. There are many kinds of male condoms. Some condoms are lubricated. Some are ribbed. Most have a \"reservoir tip\" for holding the semen. You can also buy condoms of different sizes. How do you use a condom? Condoms work best if you follow these steps. · Use a new condom each time you have sex. · Check the condom's expiration date. Do not use it past that date. · When opening the condom wrapper, be sure not to poke a hole in the condom with your fingernails, teeth, or other sharp objects. · Put the condom on as soon as the penis is hard (erect) and before any sexual contact with your partner. ? First, hold the tip of the condom and squeeze out the air. This leaves room for the semen after you ejaculate. ? If you are not circumcised, pull down the loose skin from the head of the penis (foreskin) before you put on the condom. ? Hold on to the tip of the condom as you unroll the condom. Unroll it all the way down to the base of the penis. · After you ejaculate, hold on to the condom at the base of the penis, and withdraw from your partner while your penis is still erect. This will keep semen from spilling out of the condom. · Wash your hands after you handle a used condom. How do you buy and store condoms? · Male condoms may be available for free at family planning clinics. You can buy them without a prescription at drugstores, online, and in some grocery stores. · Keep condoms wrapped in their original packages until you are ready to use them.  Store them in a cool, dry place out of direct sunlight. · Don't keep rubber (latex) condoms in a glove compartment or other hot places for a long time. Heat weakens latex and increases the chance that the condom will break. · Don't use condoms in damaged packages. And don't use condoms that are brittle, sticky, or discolored, even if they are not past their expiration date. What else do you need to know? · To protect yourself and your partner from STIs, use a condom during vaginal, oral, or anal sex. · If the condom breaks or you think sperm may have leaked out into the vagina, the woman can use emergency contraception to help prevent pregnancy. The most effective emergency contraception is prescribed by a doctor. This includes the copper IUD (inserted by a doctor) or a prescription pill. You can also get emergency contraceptive pills without a prescription at most [facility]. · Use a male condom with another form of birth control. It's the best way to prevent pregnancy. ? You can use the condom with hormonal contraception, an intrauterine device (IUD), a diaphragm, a sponge, a shield, or a cervical cap. ? Don't use a male condom with a female condom. ? Use spermicide as its instructions say. Don't put spermicide inside a condom. · If you or your partner gets a rash or feels itchy after using a latex condom, talk to your doctor. You may have a latex allergy. Where can you learn more? Go to http://www.gray.com/  Enter R522 in the search box to learn more about \"Learning How to Use a Male Condom. \"  Current as of: February 11, 2020               Content Version: 12.6  © 1640-9679 Healthwise, Incorporated. Care instructions adapted under license by [employer] (which disclaims liability or warranty for this information).  If you have questions about a medical condition or this instruction, always ask your healthcare professional. Erin Burger disclaims any warranty or liability for your use of this information.

## 2020-10-27 NOTE — TELEPHONE ENCOUNTER
Notified pt Dr. Mitch Herbert does not want to check A1c at this time. Pt verbalized understanding of information discussed w/ no further questions at this time.

## 2020-10-27 NOTE — PROGRESS NOTES
Nessa Kay is a 64 y.o. male who presents for evaluation of routine follow up. Last seen by me march 25, 2020 in [facility]. Has new job now, but has to drive to [place] daily. Hoping to find something closer soon, though he likes what he does and his new supervisor. Is back with his old GF, and she wants him tested for any stds--he has no complaints or concerns. ROS:  Constitutional: negative for fevers, chills, anorexia and weight loss  Eyes:   negative for visual disturbance and irritation  ENT:   negative for tinnitus,sore throat,nasal congestion,ear pain,hoarseness  Respiratory:  negative for cough, hemoptysis, dyspnea,wheezing  CV:   negative for chest pain, palpitations, lower extremity edema  GI:   negative for nausea, vomiting, diarrhea, abdominal pain,melena  Genitourinary: negative for frequency, dysuria and hematuria  Musculoskel: negative for myalgias, arthralgias, back pain, muscle weakness, joint pain  Neurological:  negative for headaches, dizziness, focal weakness, numbness  Psychiatric:     Negative for depression or anxiety      Past Medical History:   Diagnosis Date    CKD (chronic kidney disease)     [facility] [address]     ED (erectile dysfunction)     Hypercholesterolemia     Hypertension        History reviewed. No pertinent surgical history. History reviewed. No pertinent family history.     Social History     Socioeconomic History    Marital status:      Spouse name: Not on file    Number of children: Not on file    Years of education: Not on file    Highest education level: Not on file   Occupational History    Not on file   Social Needs    Financial resource strain: Not on file    Food insecurity     Worry: Not on file     Inability: Not on file    Transportation needs     Medical: Not on file     Non-medical: Not on file   Tobacco Use    Smoking status: Never Smoker    Smokeless tobacco: Never Used   Substance and Sexual Activity    Alcohol use: No    Drug use: Not Currently    Sexual activity: Yes     Partners: Female     Comment: . one son. track star in hs. . Lifestyle    Physical activity     Days per week: Not on file     Minutes per session: Not on file    Stress: Not on file   Relationships    Social connections     Talks on phone: Not on file     Gets together: Not on file     Attends Faith service: Not on file     Active member of club or organization: Not on file     Attends meetings of clubs or organizations: Not on file     Relationship status: Not on file    Intimate partner violence     Fear of current or ex partner: Not on file     Emotionally abused: Not on file     Physically abused: Not on file     Forced sexual activity: Not on file   Other Topics Concern    Not on file   Social History Narrative    Not on file            Visit Vitals  /86 (BP 1 Location: Left arm, BP Patient Position: Sitting)   Pulse 99   Temp 97.2 °F (36.2 °C) (Temporal)   Resp 16   Ht 5' 6\" (1.676 m)   Wt 178 lb (80.7 kg)   SpO2 96%   BMI 28.73 kg/m²       Physical Examination:   General - Well appearing male  HEENT - PERRL, TM no erythema/opacification, normal nasal turbinates, no oropharyngeal erythema or exudate, MMM  Neck - supple, no bruits, no thyroidomegaly, no lymphadenopathy  Pulm - clear to auscultation bilaterally  Cardio - RRR, normal S1 S2, no murmur  Abd - soft, nontender, no masses, no HSM  Extrem - no edema, +2 distal pulses  Neuro-  No focal deficits, CN intact     Assessment/Plan:    1.   Dm, type 2--on jardiance, amaryl, glucophage. Last a1c 6.7  2. Possible std exposure--check hsv, hiv, g/c  3.  ckd 3--has been stable  4.  hyperlipids--on crestor  5.  htn--controlled with norvasc, lotension    Both flu shot and 1st shingrix given today.   rtc 6 months        Ephriam Sheila III, DO

## 2020-10-27 NOTE — TELEPHONE ENCOUNTER
Patient states he had Labs done at [address] [facility] [address] & A1C was supposed to be on his Orders & were not & he's pretty upset due to he drives almost 208 miles to work & to get this done today. Patient states a call needs to be made to [facility] to give them a Verbal. Please call patient when done.  Thank you

## 2020-10-29 LAB
C TRACH RRNA SPEC QL NAA+PROBE: NEGATIVE
HIV 1+2 AB+HIV1 P24 AG SERPL QL IA: NON REACTIVE
HSV1 IGG SER IA-ACNC: <0.91 INDEX (ref 0–0.9)
HSV1 IGM TITR SER IF: NORMAL TITER
HSV2 IGG SER IA-ACNC: <0.91 INDEX (ref 0–0.9)
HSV2 IGM TITR SER IF: NORMAL TITER
N GONORRHOEA RRNA SPEC QL NAA+PROBE: NEGATIVE
RPR SER QL: NON REACTIVE

## 2020-11-18 ENCOUNTER — TELEPHONE (OUTPATIENT)
Dept: INTERNAL MEDICINE CLINIC | Age: 60
End: 2020-11-18

## 2020-11-18 NOTE — TELEPHONE ENCOUNTER
----- Message from Maria R Patel sent at 11/18/2020  3:15 PM EST -----  Regarding: Dr Chaney Ask Message/Vendor Calls    Caller's first and last name: n/a      Reason for call: Shingles Shot, injection 2      Callback required yes/no and why: yes      Best contact number(s):[phone]      Details to clarify the request: Pt would love to come in on the 25th or 27th around 10 am.       Message from [facility]

## 2020-11-19 NOTE — TELEPHONE ENCOUNTER
Notified pt our office is currently out of shingrix but to call back on 11/23. Pt verbalized understanding of information discussed w/ no further questions at this time.

## 2020-12-18 ENCOUNTER — TELEPHONE (OUTPATIENT)
Dept: INTERNAL MEDICINE CLINIC | Age: 60
End: 2020-12-18

## 2020-12-18 DIAGNOSIS — E11.21 TYPE 2 DIABETES MELLITUS WITH DIABETIC NEPHROPATHY, WITHOUT LONG-TERM CURRENT USE OF INSULIN (HCC): ICD-10-CM

## 2020-12-18 RX ORDER — AMLODIPINE BESYLATE 10 MG/1
10 TABLET ORAL DAILY
Qty: 90 TAB | Refills: 3 | Status: SHIPPED | OUTPATIENT
Start: 2020-12-18 | End: 2021-11-16

## 2020-12-18 RX ORDER — BENAZEPRIL HYDROCHLORIDE 40 MG/1
40 TABLET ORAL DAILY
Qty: 90 TAB | Refills: 3 | Status: SHIPPED | OUTPATIENT
Start: 2020-12-18 | End: 2021-11-16

## 2020-12-18 RX ORDER — GLIMEPIRIDE 4 MG/1
4 TABLET ORAL
Qty: 90 TAB | Refills: 3 | Status: SHIPPED | OUTPATIENT
Start: 2020-12-18 | End: 2021-11-16

## 2020-12-18 NOTE — TELEPHONE ENCOUNTER
----- Message from Virginia Yu sent at 12/18/2020 10:31 AM EST -----  Regarding: Dr. Josefina Botello / Farida Saldana (if not patient): NA      Relationship of caller (if not patient): NA      Best contact number(s):[phone]      Name of medication and dosage if known: \"Amlodipine 10mg\", \"Benazepril 40mg\", \"Glimepride 4mg\"      Is patient out of this medication (yes/no): No       Pharmacy name: [address] [place] [address] listed in chart? (yes/no): Yes   Pharmacy phone number: [phone]      Details to clarify the request: 90 day supply on all meds if possible.        Message from [facility]

## 2020-12-18 NOTE — TELEPHONE ENCOUNTER
Notified pt records will be mailed to pt. Pt verbalized understanding of information discussed w/ no further questions at this time.

## 2020-12-18 NOTE — TELEPHONE ENCOUNTER
----- Message from Virginia Yu sent at 12/18/2020 10:35 AM EST -----  Regarding: Dr. Donovan Conner / telephone  General Message/Vendor Calls    Caller's first and last name: NA      Reason for call: Proof of 1st Shingles shot to get booster shot a local pharmacy       Callback required yes/no and why:Yes       Best contact number(s): [phone]      Details to clarify the request: can email if possible. [email or web address]        Message from [facility]

## 2021-03-30 ENCOUNTER — TELEPHONE (OUTPATIENT)
Dept: INTERNAL MEDICINE CLINIC | Age: 61
End: 2021-03-30

## 2021-03-30 NOTE — TELEPHONE ENCOUNTER
Spoke with patient. Two pt identifiers confirmed. Patient offered an appointment with Dr. Veronique Garcia on 04/02/2021. Appointment accepted. Patient advised if anything changes or if unable to keep this appointment to call the office  Pt verbalized understanding of information discussed w/ no further questions at this time.

## 2021-03-30 NOTE — TELEPHONE ENCOUNTER
Patient states he needs a call back to get an Acute Appt this Friday with Dr. Danis Mayes for a Pulled Muscle in his Buttocks area. Please call.  Thank you

## 2021-04-02 ENCOUNTER — OFFICE VISIT (OUTPATIENT)
Dept: INTERNAL MEDICINE CLINIC | Age: 61
End: 2021-04-02
Payer: COMMERCIAL

## 2021-04-02 VITALS
SYSTOLIC BLOOD PRESSURE: 114 MMHG | BODY MASS INDEX: 28.73 KG/M2 | WEIGHT: 178.8 LBS | DIASTOLIC BLOOD PRESSURE: 76 MMHG | OXYGEN SATURATION: 96 % | TEMPERATURE: 98 F | HEART RATE: 107 BPM | RESPIRATION RATE: 12 BRPM | HEIGHT: 66 IN

## 2021-04-02 DIAGNOSIS — E78.5 HYPERLIPIDEMIA ASSOCIATED WITH TYPE 2 DIABETES MELLITUS (HCC): ICD-10-CM

## 2021-04-02 DIAGNOSIS — N18.30 STAGE 3 CHRONIC KIDNEY DISEASE, UNSPECIFIED WHETHER STAGE 3A OR 3B CKD (HCC): ICD-10-CM

## 2021-04-02 DIAGNOSIS — N40.0 BENIGN PROSTATIC HYPERPLASIA, UNSPECIFIED WHETHER LOWER URINARY TRACT SYMPTOMS PRESENT: ICD-10-CM

## 2021-04-02 DIAGNOSIS — N52.9 ERECTILE DYSFUNCTION, UNSPECIFIED ERECTILE DYSFUNCTION TYPE: ICD-10-CM

## 2021-04-02 DIAGNOSIS — E11.21 TYPE 2 DIABETES MELLITUS WITH DIABETIC NEPHROPATHY, WITHOUT LONG-TERM CURRENT USE OF INSULIN (HCC): ICD-10-CM

## 2021-04-02 DIAGNOSIS — E11.69 HYPERLIPIDEMIA ASSOCIATED WITH TYPE 2 DIABETES MELLITUS (HCC): ICD-10-CM

## 2021-04-02 DIAGNOSIS — I10 ESSENTIAL HYPERTENSION: ICD-10-CM

## 2021-04-02 DIAGNOSIS — Z00.00 ANNUAL PHYSICAL EXAM: Primary | ICD-10-CM

## 2021-04-02 PROCEDURE — 99396 PREV VISIT EST AGE 40-64: CPT | Performed by: INTERNAL MEDICINE

## 2021-04-02 RX ORDER — SILDENAFIL 100 MG/1
100 TABLET, FILM COATED ORAL AS NEEDED
Qty: 24 TAB | Refills: 11 | Status: SHIPPED | OUTPATIENT
Start: 2021-04-02 | End: 2022-09-30 | Stop reason: SDUPTHER

## 2021-04-02 RX ORDER — METFORMIN HYDROCHLORIDE 500 MG/1
500 TABLET, EXTENDED RELEASE ORAL 2 TIMES DAILY WITH MEALS
Qty: 180 TAB | Refills: 3
Start: 2021-04-02 | End: 2022-03-23 | Stop reason: SDUPTHER

## 2021-04-02 NOTE — PROGRESS NOTES
Willian Skinner is a 64 y.o. male who presents for evaluation of annual cpe. Last seen by me oct 27, 2020. Overall has done well since then, though did have a bout of right sided sciatica, that got better with aleve. Still working in [employer], but interviewing for jobs closer to here. ROS:  Constitutional: negative for fevers, chills, anorexia and weight loss  Eyes:   negative for visual disturbance and irritation  ENT:   negative for tinnitus,sore throat,nasal congestion,ear pain,hoarseness  Respiratory:  negative for cough, hemoptysis, dyspnea,wheezing  CV:   negative for chest pain, palpitations, lower extremity edema  GI:   negative for nausea, vomiting, diarrhea, abdominal pain,melena  Genitourinary: negative for frequency, dysuria and hematuria  Musculoskel: negative for myalgias, arthralgias, back pain, muscle weakness, joint pain  Neurological:  negative for headaches, dizziness, focal weakness, numbness  Psychiatric:     Negative for depression or anxiety      Past Medical History:   Diagnosis Date    CKD (chronic kidney disease)     Diabetes ([facility] [address]     ED (erectile dysfunction)     Hypercholesterolemia     Hypertension        History reviewed. No pertinent surgical history. History reviewed. No pertinent family history.     Social History     Socioeconomic History    Marital status:      Spouse name: Not on file    Number of children: Not on file    Years of education: Not on file    Highest education level: Not on file   Occupational History    Not on file   Social Needs    Financial resource strain: Not on file    Food insecurity     Worry: Not on file     Inability: Not on file    Transportation needs     Medical: Not on file     Non-medical: Not on file   Tobacco Use    Smoking status: Never Smoker    Smokeless tobacco: Never Used   Substance and Sexual Activity    Alcohol use: No    Drug use: Not Currently    Sexual activity: Yes     Partners: Female     Comment: . one son. track star in hs. . Lifestyle    Physical activity     Days per week: Not on file     Minutes per session: Not on file    Stress: Not on file   Relationships    Social connections     Talks on phone: Not on file     Gets together: Not on file     Attends Faith service: Not on file     Active member of club or organization: Not on file     Attends meetings of clubs or organizations: Not on file     Relationship status: Not on file    Intimate partner violence     Fear of current or ex partner: Not on file     Emotionally abused: Not on file     Physically abused: Not on file     Forced sexual activity: Not on file   Other Topics Concern    Not on file   Social History Narrative    Not on file            Visit Vitals  /76 (BP 1 Location: Left upper arm, BP Patient Position: Sitting)   Pulse (!) 107   Temp 98 °F (36.7 °C) (Temporal)   Resp 12   Ht 5' 6\" (1.676 m)   Wt 178 lb 12.8 oz (81.1 kg)   SpO2 96%   BMI 28.86 kg/m²       Physical Examination:   General - Well appearing male  HEENT - PERRL, TM no erythema/opacification, normal nasal turbinates, no oropharyngeal erythema or exudate, MMM  Neck - supple, no bruits, no thyroidomegaly, no lymphadenopathy  Pulm - clear to auscultation bilaterally  Cardio - RRR, normal S1 S2, no murmur  Abd - soft, nontender, no masses, no HSM  Extrem - no edema, +2 distal pulses  Neuro-  No focal deficits, CN intact     Assessment/Plan:    1. Annual cpe--check cbc, cmp, tsh, psa  2. Dm, type 2--check a1c, urine micro. On jardiance, glucophage, amaryl  3.  htn--controlled with norvasc, lotensin. Check cbc, cmp  4.  hyperlipids--on crestor. Check flp, cmp  5. ED--check psa. Refills given for viagra  6.  ckd 3--check cmp    Had covid vaccine today. States had 2nd shingrix a few months ago at pharmacy.   rtc 6 months        Bianka Bah III, DO

## 2021-04-02 NOTE — PATIENT INSTRUCTIONS
Well Visit, Men 48 to 72: Care Instructions  Overview     Well visits can help you stay healthy. Your doctor has checked your overall health and may have suggested ways to take good care of yourself. Your doctor also may have recommended tests. At home, you can help prevent illness with healthy eating, regular exercise, and other steps. Follow-up care is a key part of your treatment and safety. Be sure to make and go to all appointments, and call your doctor if you are having problems. It's also a good idea to know your test results and keep a list of the medicines you take. How can you care for yourself at home? · Get screening tests that you and your doctor decide on. Screening helps find diseases before any symptoms appear. · Eat healthy foods. Choose fruits, vegetables, whole grains, protein, and low-fat dairy foods. Limit fat, especially saturated fat. Reduce salt in your diet. · Limit alcohol. Have no more than 2 drinks a day or 14 drinks a week. · Get at least 30 minutes of exercise on most days of the week. Walking is a good choice. You also may want to do other activities, such as running, swimming, cycling, or playing tennis or team sports. · Reach and stay at a healthy weight. This will lower your risk for many problems, such as obesity, diabetes, heart disease, and high blood pressure. · Do not smoke. Smoking can make health problems worse. If you need help quitting, talk to your doctor about stop-smoking programs and medicines. These can increase your chances of quitting for good. · Care for your mental health. It is easy to get weighed down by worry and stress. Learn strategies to manage stress, like deep breathing and mindfulness, and stay connected with your family and community. If you find you often feel sad or hopeless, talk with your doctor. Treatment can help. · Talk to your doctor about whether you have any risk factors for sexually transmitted infections (STIs).  You can help prevent STIs if you wait to have sex with a new partner (or partners) until you've each been tested for STIs. It also helps if you use condoms (male or female condoms) and if you limit your sex partners to one person who only has sex with you. Vaccines are available for some STIs. · If it's important to you to prevent pregnancy with your partner, talk with your doctor about birth control options that might be best for you. · If you think you may have a problem with alcohol or drug use, talk to your doctor. This includes prescription medicines (such as amphetamines and opioids) and illegal drugs (such as cocaine and methamphetamine). Your doctor can help you figure out what type of treatment is best for you. · Protect your skin from too much sun. When you're outdoors from 10 a.m. to 4 p.m., stay in the shade or cover up with clothing and a hat with a wide brim. Wear sunglasses that block UV rays. Even when it's cloudy, put broad-spectrum sunscreen (SPF 30 or higher) on any exposed skin. · See a dentist one or two times a year for checkups and to have your teeth cleaned. · Wear a seat belt in the car. When should you call for help? Watch closely for changes in your health, and be sure to contact your doctor if you have any problems or symptoms that concern you. Where can you learn more? Go to http://www.gray.com/  Enter I824 in the search box to learn more about \"Well Visit, Men 48 to 72: Care Instructions. \"  Current as of: May 27, 2020               Content Version: 12.8  © 8830-7737 Healthwise, Incorporated. Care instructions adapted under license by [employer] (which disclaims liability or warranty for this information). If you have questions about a medical condition or this instruction, always ask your healthcare professional. [place] [address] any warranty or liability for your use of this information. Get fasting labs done.   Nothing to eat after MN, but may have water.

## 2021-06-11 RX ORDER — ROSUVASTATIN CALCIUM 40 MG/1
TABLET, COATED ORAL
Qty: 90 TABLET | Refills: 3 | Status: SHIPPED | OUTPATIENT
Start: 2021-06-11 | End: 2022-05-13 | Stop reason: SDUPTHER

## 2021-06-11 NOTE — TELEPHONE ENCOUNTER
PCP: Darrell Becker DO    Last appt: 4/2/2021    Future Appointments   Date Time Provider Deepti Dulce   10/8/2021 11:00 AM José Manuel Dunlap DO [facility] BS AMB       Requested Prescriptions     Pending Prescriptions Disp Refills    rosuvastatin (CRESTOR) 40 mg tablet 90 Tablet 3     Sig: TAKE 1 TABLET BY MOUTH NIGHTLY       Prior labs and Blood pressures:  BP Readings from Last 3 Encounters:   04/02/21 114/76   10/27/20 125/86   03/25/20 133/79     Lab Results   Component Value Date/Time    Sodium 140 05/19/2021 11:23 AM    Potassium 3.8 05/19/2021 11:23 AM    Chloride 107 05/19/2021 11:23 AM    CO2 25 05/19/2021 11:23 AM    Anion gap 8 05/19/2021 11:23 AM    Glucose 127 (H) 05/19/2021 11:23 AM    BUN 15 05/19/2021 11:23 AM    Creatinine 1.02 05/19/2021 11:23 AM    BUN/Creatinine ratio 15 05/19/2021 11:23 AM    GFR est AA >60 05/19/2021 11:23 AM    GFR est non-AA >60 05/19/2021 11:23 AM    Calcium 8.9 05/19/2021 11:23 AM     Lab Results   Component Value Date/Time    Hemoglobin A1c 6.9 (H) 05/19/2021 11:23 AM    Hemoglobin A1c (POC) 6.5 11/11/2019 10:31 AM     Lab Results   Component Value Date/Time    Cholesterol, total 150 05/19/2021 11:23 AM    HDL Cholesterol 42 05/19/2021 11:23 AM    LDL, calculated 78.4 05/19/2021 11:23 AM    VLDL, calculated 29.6 05/19/2021 11:23 AM    Triglyceride 148 05/19/2021 11:23 AM    CHOL/HDL Ratio 3.6 05/19/2021 11:23 AM     No results found for: An [place] [id number]    Lab Results   Component Value Date/Time    TSH 3.430 05/19/2021 11:23 AM    TSH 2.770 04/10/2019 11:54 AM

## 2021-06-11 NOTE — TELEPHONE ENCOUNTER
Medication Refill     Caller (if not patient): Pt       Relationship of caller (if not patient): self       Best contact number(s): [phone]       Name of medication and dosage if known: rosuvastatin (CRESTOR) 40 mg tablet       Is patient out of this medication (yes/no): no       Pharmacy name: [id number] [id number] listed in chart? (yes/no): yes   Pharmacy phone number:  [phone]         Details to clarify the request: N/A     Message from [facility]

## 2021-06-23 DIAGNOSIS — E11.21 TYPE 2 DIABETES MELLITUS WITH DIABETIC NEPHROPATHY, WITHOUT LONG-TERM CURRENT USE OF INSULIN (HCC): ICD-10-CM

## 2021-06-23 NOTE — TELEPHONE ENCOUNTER
[id number]      Pt needs refill of:  empagliflozin (Jardiance) 25 mg tablet    Pharmacy informed patient that he needs prior auth    Pharmacy:  [address] [facility]  [phone]      Down to 3 pills, didn't know prior auth would be needed.

## 2021-06-25 NOTE — TELEPHONE ENCOUNTER
----- Message from Elisabeth Berrios sent at 6/25/2021  8:21 AM EDT -----  Regarding: Dr. Tom Link Message/Vendor Calls    Caller's first and last name:pt      Reason for call: Prior Auth Status of Jardiance      Callback required yes/no and why:yes      Best contact number(s):[phone]      Details to clarify the request: Pt states the pharmacy hasn't heard anything back about his jardiance and would like to know the status as pt only has 1 pill left.       Message from [facility]

## 2021-06-25 NOTE — TELEPHONE ENCOUNTER
Submitted information for PA via cover my meds. Anton Hutton approved at this time. Informed patient that PA was approved and that he should be able to contact pharmacy for refill at this time. Patient states that he understands the information received at this time. Patient had no further questions or concerns at this time.

## 2021-10-02 NOTE — TELEPHONE ENCOUNTER
Information called to patient that Urology referral ordered. Patient will call with date, time and Dr. Name if referral needs to be completed through insurance.
Order placed to see the urologist.
Brenden Glover, PGY-2

## 2021-11-15 ENCOUNTER — OFFICE VISIT (OUTPATIENT)
Dept: INTERNAL MEDICINE CLINIC | Age: 61
End: 2021-11-15
Payer: COMMERCIAL

## 2021-11-15 VITALS
HEIGHT: 66 IN | SYSTOLIC BLOOD PRESSURE: 124 MMHG | RESPIRATION RATE: 16 BRPM | WEIGHT: 187 LBS | HEART RATE: 97 BPM | TEMPERATURE: 98.3 F | OXYGEN SATURATION: 95 % | DIASTOLIC BLOOD PRESSURE: 81 MMHG | BODY MASS INDEX: 30.05 KG/M2

## 2021-11-15 DIAGNOSIS — N18.30 STAGE 3 CHRONIC KIDNEY DISEASE, UNSPECIFIED WHETHER STAGE 3A OR 3B CKD (HCC): ICD-10-CM

## 2021-11-15 DIAGNOSIS — E11.21 TYPE 2 DIABETES MELLITUS WITH DIABETIC NEPHROPATHY, WITHOUT LONG-TERM CURRENT USE OF INSULIN (HCC): Primary | ICD-10-CM

## 2021-11-15 DIAGNOSIS — E11.69 HYPERLIPIDEMIA ASSOCIATED WITH TYPE 2 DIABETES MELLITUS (HCC): ICD-10-CM

## 2021-11-15 DIAGNOSIS — Z23 NEEDS FLU SHOT: ICD-10-CM

## 2021-11-15 DIAGNOSIS — E78.5 HYPERLIPIDEMIA ASSOCIATED WITH TYPE 2 DIABETES MELLITUS (HCC): ICD-10-CM

## 2021-11-15 DIAGNOSIS — I10 ESSENTIAL HYPERTENSION: ICD-10-CM

## 2021-11-15 DIAGNOSIS — N52.9 ERECTILE DYSFUNCTION, UNSPECIFIED ERECTILE DYSFUNCTION TYPE: ICD-10-CM

## 2021-11-15 LAB
CREAT UR-MCNC: 169 MG/DL
MICROALBUMIN UR-MCNC: 5.53 MG/DL
MICROALBUMIN/CREAT UR-RTO: 33 MG/G (ref 0–30)

## 2021-11-15 PROCEDURE — 90471 IMMUNIZATION ADMIN: CPT | Performed by: INTERNAL MEDICINE

## 2021-11-15 PROCEDURE — 99213 OFFICE O/P EST LOW 20 MIN: CPT | Performed by: INTERNAL MEDICINE

## 2021-11-15 PROCEDURE — 90686 IIV4 VACC NO PRSV 0.5 ML IM: CPT | Performed by: INTERNAL MEDICINE

## 2021-11-15 NOTE — PATIENT INSTRUCTIONS
Learning About Tests When You Have Diabetes  Why do you need regular tests? Diabetes can lead to other health problems if it's not well controlled. You'll need tests to monitor how well your diabetes is controlled and to check for other things like high cholesterol or kidney problems. Having tests on a regular schedule can help your doctor find problems early, when it's easier to manage them. What tests do you need? These are the tests you may need and how often you should have them. A1c blood test.   This test shows the average level of blood sugar over the past 2 to 3 months. It helps your doctor see whether blood sugar levels have been staying within your target range. · How often: Every 3 to 6 months  · Goal: A blood sugar level in your target range  Blood pressure test.   This test measures the pressure of blood flow in the arteries. Controlling blood pressure can help prevent damage to nerves and blood vessels. · How often: Every 3 to 6 months  · Goal: A blood pressure level in your target range  Cholesterol test.   This test measures the amount of a type of fat in the blood. It is common for people with diabetes to also have high cholesterol. Too much cholesterol in the blood can build up inside the blood vessels and raise the risk for heart attack and stroke. · How often: At the time of your diabetes diagnosis, and as often as your doctor recommends after that  · Goal: A cholesterol level in your target range  Albumin-creatinine ratio test.   This test checks for kidney damage by looking for the protein albumin (say \"al-BYOO-jovani\") in the urine. Albumin is normally found in the blood. Kidney damage can let small amounts of it (microalbumin) leak into the urine. · How often: Once a year  · Goal: No protein in the urine  Blood creatinine test/estimated glomerular filtration (eGFR). The blood creatinine (say \"ebsi-JC-wt-neen\") level shows how well your kidneys are working.  Creatinine is a waste product that muscles release into the blood. Blood creatinine is used to estimate the glomerular filtration rate. A high level of creatinine and/or a low eGFR may mean your kidneys are not working as well as they should. · How often: Once a year  · Goal: Normal level of creatinine in the blood. The eGFR goal is greater than 60 mL/min/1.73 m². Complete foot exam.   The doctor checks for foot sores and whether any sensation has been lost.  · How often: Once a year  · Goal: Healthy feet with no foot ulcers or loss of feeling  Dental exam and cleaning. The dentist checks for gum disease and tooth decay. People with high blood sugar are more likely to have these problems. · How often: Every 6 months  · Goal: Healthy teeth and gums  Complete eye exam.   High blood sugar levels can damage the eyes. This exam is done by an ophthalmologist or optometrist. It includes a dilated eye exam. The exam shows whether there's damage to the back of the eye (diabetic retinopathy). · How often: Once a year. If you don't have any signs of diabetic retinopathy, your doctor may recommend an exam every 2 years. · Goal: No damage to the back of the eye  Thyroid-stimulating hormone (TSH) blood test.   This test checks for thyroid disease. Too little thyroid hormone can cause some medicines (like insulin) to stay in the body longer. This can cause low blood sugar. You may be tested if you have high cholesterol or are a woman over 48years old. · How often: As part of your diabetes diagnosis, and as often as your doctor recommends after that  · Goal: Normal level of TSH in the blood  Follow-up care is a key part of your treatment and safety. Be sure to make and go to all appointments, and call your doctor if you are having problems. It's also a good idea to know your test results and keep a list of the medicines you take. Where can you learn more?   Go to http://www.[employer].com/  Enter A243 in the search box to learn more about \"Learning About Tests When You Have Diabetes. \"  Current as of: August 31, 2020               Content Version: 13.0  © 1273-5660 Healthwise, Incorporated. Care instructions adapted under license by [employer] (which disclaims liability or warranty for this information). If you have questions about a medical condition or this instruction, always ask your healthcare professional. [place] [address] any warranty or liability for your use of this information. Make an appt to get your eyes examined. Need a 'diabetic eye exam', where they dilate your eyes.

## 2021-11-15 NOTE — PROGRESS NOTES
Ayanna Gray is a 64 y.o. male who presents for evaluation of routine follow up. Last seen by me April 2, 2021 in [facility] [place]. Has a new job since then. No longer working in [employer], now works for [employer]. He enjoys the job. Weight up 9 lbs. ROS:  Constitutional: negative for fevers, chills, anorexia and weight loss  Eyes:   negative for visual disturbance and irritation  ENT:   negative for tinnitus,sore throat,nasal congestion,ear pain,hoarseness  Respiratory:  negative for cough, hemoptysis, dyspnea,wheezing  CV:   negative for chest pain, palpitations, lower extremity edema  GI:   negative for nausea, vomiting, diarrhea, abdominal pain,melena  Genitourinary: negative for frequency, dysuria and hematuria  Musculoskel: negative for myalgias, arthralgias, back pain, muscle weakness, joint pain  Neurological:  negative for headaches, dizziness, focal weakness, numbness  Psychiatric:     Negative for depression or anxiety      Past Medical History:   Diagnosis Date    CKD (chronic kidney disease)     Diabetes ([facility] [address]     ED (erectile dysfunction)     Hypercholesterolemia     Hypertension     Kendra-implantitis, dental        History reviewed. No pertinent surgical history. History reviewed. No pertinent family history. Social History     Socioeconomic History    Marital status:      Spouse name: Not on file    Number of children: Not on file    Years of education: Not on file    Highest education level: Not on file   Occupational History    Not on file   Tobacco Use    Smoking status: Never Smoker    Smokeless tobacco: Never Used   Substance and Sexual Activity    Alcohol use: No    Drug use: Not Currently    Sexual activity: Yes     Partners: Female     Comment: . one son. track star in hs. .    Other Topics Concern    Not on file   Social History Narrative    Not on file     Social Determinants of Health     Financial Resource Strain:     Difficulty of Paying Living Expenses: Not on file   Food Insecurity:     Worried About Running Out of Food in the Last Year: Not on file    Ran Out of Food in the Last Year: Not on file   Transportation Needs:     Lack of Transportation (Medical): Not on file    Lack of Transportation (Non-Medical): Not on file   Physical Activity:     Days of Exercise per Week: Not on file    Minutes of Exercise per Session: Not on file   Stress:     Feeling of Stress : Not on file   Social Connections:     Frequency of Communication with Friends and Family: Not on file    Frequency of Social Gatherings with Friends and Family: Not on file    Attends Yarsanism Services: Not on file    Active Member of [address] [place] [address] [employer] or Organizations: Not on file    Attends Club or Organization Meetings: Not on file    Marital Status: Not on file   Intimate Partner Violence:     Fear of Current or Ex-Partner: Not on file    Emotionally Abused: Not on file    Physically Abused: Not on file    Sexually Abused: Not on file   Housing Stability:     Unable to Pay for Housing in the Last Year: Not on file    Number of [place] in the Last Year: Not on file    Unstable Housing in the Last Year: Not on file            Visit Vitals  /81 (BP 1 Location: Left upper arm, BP Patient Position: Sitting)   Pulse 97   Temp 98.3 °F (36.8 °C) (Temporal)   Resp 16   Ht 5' 6\" (1.676 m)   Wt 187 lb (84.8 kg)   SpO2 95%   BMI 30.18 kg/m²       Physical Examination:   General - Well appearing male  HEENT - PERRL, TM no erythema/opacification, normal nasal turbinates, no oropharyngeal erythema or exudate, MMM  Neck - supple, no bruits, no thyroidomegaly, no lymphadenopathy  Pulm - clear to auscultation bilaterally  Cardio - RRR, normal S1 S2, no murmur  Abd - soft, nontender, no masses, no HSM  Extrem - no edema, +2 distal pulses  Neuro-  No focal deficits, CN intact     Assessment/Plan:    1. Dm, type 2--check urine micro. Last a1c 6.9.   On Eric Gusman, glucophage  2. htn--controlled with norvasc, lotensin  3.  hyperlipids--on crestor  4.  ckd 3--has been stable  5. ED--has prn viagra    Flu shot given today.   Encouraged to get eye exam        Cindy Forrester III, DO

## 2022-03-18 PROBLEM — E78.5 HYPERLIPIDEMIA ASSOCIATED WITH TYPE 2 DIABETES MELLITUS (HCC): Status: ACTIVE | Noted: 2019-04-10

## 2022-03-18 PROBLEM — I10 ESSENTIAL HYPERTENSION: Status: ACTIVE | Noted: 2017-03-20

## 2022-03-18 PROBLEM — E11.69 HYPERLIPIDEMIA ASSOCIATED WITH TYPE 2 DIABETES MELLITUS (HCC): Status: ACTIVE | Noted: 2019-04-10

## 2022-03-19 PROBLEM — N18.30 CKD (CHRONIC KIDNEY DISEASE) STAGE 3, GFR 30-59 ML/MIN (HCC): Status: ACTIVE | Noted: 2019-04-10

## 2022-03-23 RX ORDER — METFORMIN HYDROCHLORIDE 500 MG/1
500 TABLET, EXTENDED RELEASE ORAL 2 TIMES DAILY WITH MEALS
Qty: 180 TABLET | Refills: 3 | Status: SHIPPED | OUTPATIENT
Start: 2022-03-23 | End: 2022-08-15

## 2022-03-23 NOTE — TELEPHONE ENCOUNTER
PCP: Latonya Malcolm DO    Last appt: 11/15/2021  No future appointments. Requested Prescriptions     Pending Prescriptions Disp Refills    metFORMIN ER (GLUCOPHAGE XR) 500 mg tablet 180 Tablet 3     Sig: Take 1 Tablet by mouth two (2) times daily (with meals).        Prior labs and Blood pressures:  BP Readings from Last 3 Encounters:   11/15/21 124/81   04/02/21 114/76   10/27/20 125/86     Lab Results   Component Value Date/Time    Sodium 140 05/19/2021 11:23 AM    Potassium 3.8 05/19/2021 11:23 AM    Chloride 107 05/19/2021 11:23 AM    CO2 25 05/19/2021 11:23 AM    Anion gap 8 05/19/2021 11:23 AM    Glucose 127 (H) 05/19/2021 11:23 AM    BUN 15 05/19/2021 11:23 AM    Creatinine 1.02 05/19/2021 11:23 AM    BUN/Creatinine ratio 15 05/19/2021 11:23 AM    GFR est AA >60 05/19/2021 11:23 AM    GFR est non-AA >60 05/19/2021 11:23 AM    Calcium 8.9 05/19/2021 11:23 AM     Lab Results   Component Value Date/Time    Hemoglobin A1c 6.9 (H) 05/19/2021 11:23 AM    Hemoglobin A1c (POC) 6.5 11/11/2019 10:31 AM     Lab Results   Component Value Date/Time    Cholesterol, total 150 05/19/2021 11:23 AM    HDL Cholesterol 42 05/19/2021 11:23 AM    LDL, calculated 78.4 05/19/2021 11:23 AM    VLDL, calculated 29.6 05/19/2021 11:23 AM    Triglyceride 148 05/19/2021 11:23 AM    CHOL/HDL Ratio 3.6 05/19/2021 11:23 AM     No results found for: Katalina Del Cid [id number]    Lab Results   Component Value Date/Time    TSH 3.430 05/19/2021 11:23 AM    TSH 2.770 04/10/2019 11:54 AM

## 2022-03-23 NOTE — TELEPHONE ENCOUNTER
Patient states he needs refill done thru [facility] on file/indicated. Please call if any questions.  Thank you    Patient reminded of [address] Turn around on refills      Patient last appt was 11/15/21

## 2022-05-13 ENCOUNTER — TELEPHONE (OUTPATIENT)
Dept: INTERNAL MEDICINE CLINIC | Age: 62
End: 2022-05-13

## 2022-05-13 RX ORDER — ROSUVASTATIN CALCIUM 40 MG/1
TABLET, COATED ORAL
Qty: 90 TABLET | Refills: 3 | Status: SHIPPED | OUTPATIENT
Start: 2022-05-13

## 2022-05-13 NOTE — TELEPHONE ENCOUNTER
Ct called requesting refill (taken care of via refill encounter). FYI Documentation:  PSR did notify patient that a CPE was scheduled for him. PSR confirmed date and time of the CPE: 8/12/22 at 8:00am  Pt verbally agrees and confirms the appt as scheduled is ok for patient. Please be advised. No further action.

## 2022-05-13 NOTE — TELEPHONE ENCOUNTER
Caller requests Refill of:  rosuvastatin (CRESTOR) 40 mg tablet      Please send to:  Pharmacy [address] [place] [address] [facility]  [phone]      Visit Appointment History:   Future:   11/15/21  Previous: 8/12/22          Caller was advised that Meds will be refilled as soon as possible, however there can be a 48-72 business hour turn around on refill requests.

## 2022-05-13 NOTE — TELEPHONE ENCOUNTER
Future Appointments:  Future Appointments   Date Time Provider Deepti Cardona   8/12/2022  8:00 AM Macho Dur[facility] BS AMB        Last Appointment With Me:  11/15/2021     Requested Prescriptions     Pending Prescriptions Disp Refills    rosuvastatin (CRESTOR) 40 mg tablet 90 Tablet 3     Sig: TAKE 1 TABLET BY MOUTH NIGHTLY

## 2022-08-12 ENCOUNTER — OFFICE VISIT (OUTPATIENT)
Dept: INTERNAL MEDICINE CLINIC | Age: 62
End: 2022-08-12

## 2022-08-12 VITALS
HEIGHT: 66 IN | HEART RATE: 92 BPM | SYSTOLIC BLOOD PRESSURE: 126 MMHG | TEMPERATURE: 98.2 F | OXYGEN SATURATION: 96 % | WEIGHT: 181.8 LBS | DIASTOLIC BLOOD PRESSURE: 76 MMHG | BODY MASS INDEX: 29.22 KG/M2 | RESPIRATION RATE: 16 BRPM

## 2022-08-12 DIAGNOSIS — Z00.00 ANNUAL PHYSICAL EXAM: Primary | ICD-10-CM

## 2022-08-12 DIAGNOSIS — E78.5 HYPERLIPIDEMIA ASSOCIATED WITH TYPE 2 DIABETES MELLITUS (HCC): ICD-10-CM

## 2022-08-12 DIAGNOSIS — E11.69 HYPERLIPIDEMIA ASSOCIATED WITH TYPE 2 DIABETES MELLITUS (HCC): ICD-10-CM

## 2022-08-12 DIAGNOSIS — E11.21 TYPE 2 DIABETES MELLITUS WITH DIABETIC NEPHROPATHY, WITHOUT LONG-TERM CURRENT USE OF INSULIN (HCC): ICD-10-CM

## 2022-08-12 DIAGNOSIS — N40.0 BENIGN PROSTATIC HYPERPLASIA, UNSPECIFIED WHETHER LOWER URINARY TRACT SYMPTOMS PRESENT: ICD-10-CM

## 2022-08-12 DIAGNOSIS — I10 ESSENTIAL HYPERTENSION: ICD-10-CM

## 2022-08-12 PROCEDURE — 99396 PREV VISIT EST AGE 40-64: CPT | Performed by: INTERNAL MEDICINE

## 2022-08-12 NOTE — PROGRESS NOTES
1. \"Have you been to the ER, urgent care clinic since your last visit? Hospitalized since your last visit? \" No    2. \"Have you seen or consulted any other health care providers outside of the [address] [place] [address] since your last visit? \" No     3. For patients aged 39-70: Has the patient had a colonoscopy / FIT/ Cologuard? Yes - no Care Gap present      If the patient is female:    4. For patients aged 41-77: Has the patient had a mammogram within the past 2 years? NA - based on age or sex      11. For patients aged 21-65: Has the patient had a pap smear?  NA - based on age or sex

## 2022-08-12 NOTE — PROGRESS NOTES
Octavia Snyder is a 63 y.o. male who presents for evaluation of annual cpe. Last seen by me nov 15, 2021. He has done well since then. Still works for [employer], now with [employer] dept. Going well. Weight down 6 lbs from last visit. He has no complaints today. ROS:  Constitutional: negative for fevers, chills, anorexia and weight loss  Eyes:   negative for visual disturbance and irritation  ENT:   negative for tinnitus,sore throat,nasal congestion,ear pain,hoarseness  Respiratory:  negative for cough, hemoptysis, dyspnea,wheezing  CV:   negative for chest pain, palpitations, lower extremity edema  GI:   negative for nausea, vomiting, diarrhea, abdominal pain,melena  Genitourinary: negative for frequency, dysuria and hematuria  Musculoskel: negative for myalgias, arthralgias, back pain, muscle weakness, joint pain  Neurological:  negative for headaches, dizziness, focal weakness, numbness  Psychiatric:     Negative for depression or anxiety      Past Medical History:   Diagnosis Date    CKD (chronic kidney disease)     Diabetes ([facility] [address]     ED (erectile dysfunction)     Hypercholesterolemia     Hypertension     Kendra-implantitis, dental        History reviewed. No pertinent surgical history. History reviewed. No pertinent family history. Social History     Socioeconomic History    Marital status:      Spouse name: Not on file    Number of children: Not on file    Years of education: Not on file    Highest education level: Not on file   Occupational History    Not on file   Tobacco Use    Smoking status: Never    Smokeless tobacco: Never   Substance and Sexual Activity    Alcohol use: No    Drug use: Not Currently    Sexual activity: Yes     Partners: Female     Comment: . one son. track star in hs. .    Other Topics Concern    Not on file   Social History Narrative    Not on file     Social Determinants of Health     Financial Resource Strain: Low Risk     Difficulty of Paying Living Expenses: Not hard at all   Food Insecurity: No Food Insecurity    Worried About Running Out of Food in the Last Year: Never true    Ran Out of Food in the Last Year: Never true   Transportation Needs: Not on file   Physical Activity: Not on file   Stress: Not on file   Social Connections: Not on file   Intimate Partner Violence: Not on file   Housing Stability: Not on file          Visit Vitals  /76 (BP 1 Location: Left upper arm, BP Patient Position: Sitting)   Pulse 92   Temp 98.2 °F (36.8 °C) (Temporal)   Resp 16   Ht 5' 6\" (1.676 m)   Wt 181 lb 12.8 oz (82.5 kg)   SpO2 96%   BMI 29.34 kg/m²       Physical Examination:   General - Well appearing male  HEENT - PERRL, TM no erythema/opacification, normal nasal turbinates, no oropharyngeal erythema or exudate, MMM  Neck - supple, no bruits, no thyroidomegaly, no lymphadenopathy  Pulm - clear to auscultation bilaterally  Cardio - RRR, normal S1 S2, no murmur  Abd - soft, nontender, no masses, no HSM  Extrem - no edema, +2 distal pulses  Neuro-  No focal deficits, CN intact         Diabetic foot exam performed by Alejandro Perdomo III, DO     Measurement  Response Nurse Comment Physician Comment   Monofilament  R - normal sensation with micro filament  L - normal sensation with micro filament     Pulse DP R - present  L - present     Pulse TP R - present  L - present     Structural deformity R - None  L - None     Skin Integrity / Deformity R - None  L - None        Reviewed by:             Assessment/Plan:     Annual cpe--  Dm, type 2--last a1c 6.9, check again with urine micro.   On glucophage, amaryl, jardiance  Htn--controlled with norvasc, lotensin  Hyperlipids--on crestor, check fllp, cmp  ED--has prn viagra    He believes he had 2nd shigrix at [facility] [address] and [address] 1 in [place]  Rt 6 months        Alejandro Perdomo III DO

## 2022-08-13 LAB
ALBUMIN SERPL-MCNC: 4.1 G/DL (ref 3.5–5)
ALBUMIN/GLOB SERPL: 1 {RATIO} (ref 1.1–2.2)
ALP SERPL-CCNC: 93 U/L (ref 45–117)
ALT SERPL-CCNC: 31 U/L (ref 12–78)
ANION GAP SERPL CALC-SCNC: 8 MMOL/L (ref 5–15)
AST SERPL-CCNC: 13 U/L (ref 15–37)
BASOPHILS # BLD: 0 K/UL (ref 0–0.1)
BASOPHILS NFR BLD: 1 % (ref 0–1)
BILIRUB SERPL-MCNC: 1 MG/DL (ref 0.2–1)
BUN SERPL-MCNC: 20 MG/DL (ref 6–20)
BUN/CREAT SERPL: 15 (ref 12–20)
CALCIUM SERPL-MCNC: 9.9 MG/DL (ref 8.5–10.1)
CHLORIDE SERPL-SCNC: 103 MMOL/L (ref 97–108)
CHOLEST SERPL-MCNC: 139 MG/DL
CO2 SERPL-SCNC: 29 MMOL/L (ref 21–32)
COMMENT, HOLDF: NORMAL
CREAT SERPL-MCNC: 1.31 MG/DL (ref 0.7–1.3)
CREAT UR-MCNC: 102 MG/DL
DIFFERENTIAL METHOD BLD: ABNORMAL
EOSINOPHIL # BLD: 0.2 K/UL (ref 0–0.4)
EOSINOPHIL NFR BLD: 4 % (ref 0–7)
ERYTHROCYTE [DISTWIDTH] IN BLOOD BY AUTOMATED COUNT: 13.2 % (ref 11.5–14.5)
EST. AVERAGE GLUCOSE BLD GHB EST-MCNC: 166 MG/DL
GLOBULIN SER CALC-MCNC: 4.3 G/DL (ref 2–4)
GLUCOSE SERPL-MCNC: 137 MG/DL (ref 65–100)
HBA1C MFR BLD: 7.4 % (ref 4–5.6)
HCT VFR BLD AUTO: 55.3 % (ref 36.6–50.3)
HDLC SERPL-MCNC: 44 MG/DL
HDLC SERPL: 3.2 {RATIO} (ref 0–5)
HGB BLD-MCNC: 17.4 G/DL (ref 12.1–17)
IMM GRANULOCYTES # BLD AUTO: 0 K/UL (ref 0–0.04)
IMM GRANULOCYTES NFR BLD AUTO: 0 % (ref 0–0.5)
LDLC SERPL CALC-MCNC: 56.8 MG/DL (ref 0–100)
LYMPHOCYTES # BLD: 1.3 K/UL (ref 0.8–3.5)
LYMPHOCYTES NFR BLD: 33 % (ref 12–49)
MCH RBC QN AUTO: 30.7 PG (ref 26–34)
MCHC RBC AUTO-ENTMCNC: 31.5 G/DL (ref 30–36.5)
MCV RBC AUTO: 97.7 FL (ref 80–99)
MICROALBUMIN UR-MCNC: 3.77 MG/DL
MICROALBUMIN/CREAT UR-RTO: 37 MG/G (ref 0–30)
MONOCYTES # BLD: 0.4 K/UL (ref 0–1)
MONOCYTES NFR BLD: 10 % (ref 5–13)
NEUTS SEG # BLD: 2.1 K/UL (ref 1.8–8)
NEUTS SEG NFR BLD: 52 % (ref 32–75)
NRBC # BLD: 0 K/UL (ref 0–0.01)
NRBC BLD-RTO: 0 PER 100 WBC
PLATELET # BLD AUTO: 256 K/UL (ref 150–400)
PMV BLD AUTO: 10.6 FL (ref 8.9–12.9)
POTASSIUM SERPL-SCNC: 3.8 MMOL/L (ref 3.5–5.1)
PROT SERPL-MCNC: 8.4 G/DL (ref 6.4–8.2)
PSA SERPL-MCNC: 2.1 NG/ML (ref 0.01–4)
RBC # BLD AUTO: 5.66 M/UL (ref 4.1–5.7)
SAMPLES BEING HELD,HOLD: NORMAL
SODIUM SERPL-SCNC: 140 MMOL/L (ref 136–145)
TRIGL SERPL-MCNC: 191 MG/DL (ref ?–150)
TSH SERPL DL<=0.05 MIU/L-ACNC: 2.86 UIU/ML (ref 0.36–3.74)
VLDLC SERPL CALC-MCNC: 38.2 MG/DL
WBC # BLD AUTO: 3.9 K/UL (ref 4.1–11.1)

## 2022-08-15 RX ORDER — METFORMIN HYDROCHLORIDE 500 MG/1
1000 TABLET, EXTENDED RELEASE ORAL 2 TIMES DAILY WITH MEALS
Qty: 360 TABLET | Refills: 3 | Status: SHIPPED | OUTPATIENT
Start: 2022-08-15

## 2022-08-17 NOTE — PROGRESS NOTES
Letter mailed to patient. Dm is bit worse, a1c up to 7.4.  would increase glucophage to 2 pills twice daily. Other labs look good.   Continue other meds as before.

## 2022-08-17 NOTE — PROGRESS NOTES
Lvm for call back to myself or Stephenie Patel for result notes. Dm is bit worse, a1c up to 7.4.  would increase glucophage to 2 pills twice daily. Other labs look good.   Continue other meds as before.

## 2022-09-30 DIAGNOSIS — N52.9 ERECTILE DYSFUNCTION, UNSPECIFIED ERECTILE DYSFUNCTION TYPE: ICD-10-CM

## 2022-09-30 RX ORDER — SILDENAFIL 100 MG/1
100 TABLET, FILM COATED ORAL AS NEEDED
Qty: 24 TABLET | Refills: 11 | Status: SHIPPED | OUTPATIENT
Start: 2022-09-30

## 2022-09-30 NOTE — TELEPHONE ENCOUNTER
Future Appointments:  Future Appointments   Date Time Provider Deepti Cardona   2/24/2023  9:00 AM Jag Dunn [facility] BS AMB        Last Appointment With Me:  8/12/2022     Requested Prescriptions     Pending Prescriptions Disp Refills    sildenafil citrate (Viagra) 100 mg tablet 24 Tablet 11     Sig: Take 1 Tablet by mouth as needed (erectile dysfunction).

## 2022-09-30 NOTE — TELEPHONE ENCOUNTER
Pt is trying to get this medication today if possible. Pt advised of 48/72 hour turn around on refills, but we would do our best.  Thanks.

## 2023-01-03 DIAGNOSIS — E11.21 TYPE 2 DIABETES MELLITUS WITH DIABETIC NEPHROPATHY, WITHOUT LONG-TERM CURRENT USE OF INSULIN (HCC): ICD-10-CM

## 2023-01-03 RX ORDER — BENAZEPRIL HYDROCHLORIDE 40 MG/1
40 TABLET ORAL DAILY
Qty: 90 TABLET | Refills: 3 | Status: SHIPPED | OUTPATIENT
Start: 2023-01-03

## 2023-01-03 RX ORDER — AMLODIPINE BESYLATE 10 MG/1
10 TABLET ORAL DAILY
Qty: 90 TABLET | Refills: 3 | Status: SHIPPED | OUTPATIENT
Start: 2023-01-03

## 2023-01-03 RX ORDER — GLIMEPIRIDE 4 MG/1
4 TABLET ORAL
Qty: 90 TABLET | Refills: 3 | Status: SHIPPED | OUTPATIENT
Start: 2023-01-03

## 2023-04-21 ENCOUNTER — OFFICE VISIT (OUTPATIENT)
Dept: INTERNAL MEDICINE CLINIC | Age: 63
End: 2023-04-21

## 2023-04-21 VITALS
DIASTOLIC BLOOD PRESSURE: 80 MMHG | SYSTOLIC BLOOD PRESSURE: 123 MMHG | HEART RATE: 96 BPM | OXYGEN SATURATION: 96 % | HEIGHT: 66 IN | RESPIRATION RATE: 16 BRPM | BODY MASS INDEX: 29.41 KG/M2 | TEMPERATURE: 98.3 F | WEIGHT: 183 LBS

## 2023-04-21 DIAGNOSIS — I10 ESSENTIAL HYPERTENSION: ICD-10-CM

## 2023-04-21 DIAGNOSIS — Z23 ENCOUNTER FOR IMMUNIZATION: ICD-10-CM

## 2023-04-21 DIAGNOSIS — E11.21 TYPE 2 DIABETES MELLITUS WITH DIABETIC NEPHROPATHY, WITHOUT LONG-TERM CURRENT USE OF INSULIN (HCC): Primary | ICD-10-CM

## 2023-04-21 DIAGNOSIS — J30.89 ENVIRONMENTAL AND SEASONAL ALLERGIES: ICD-10-CM

## 2023-04-21 DIAGNOSIS — N40.0 BENIGN PROSTATIC HYPERPLASIA, UNSPECIFIED WHETHER LOWER URINARY TRACT SYMPTOMS PRESENT: ICD-10-CM

## 2023-04-21 DIAGNOSIS — E11.69 HYPERLIPIDEMIA ASSOCIATED WITH TYPE 2 DIABETES MELLITUS (HCC): Chronic | ICD-10-CM

## 2023-04-21 DIAGNOSIS — E78.5 HYPERLIPIDEMIA ASSOCIATED WITH TYPE 2 DIABETES MELLITUS (HCC): Chronic | ICD-10-CM

## 2023-04-21 NOTE — PROGRESS NOTES
Nicolette Meyer is a 63 y.o. male who presents for evaluation of routine follow up for dm, htn, hld. Last seen by me aug 12, 2022 in cpe. He has done well since then. Only concern is allergies. Takes allegra, which helps some. ROS:  Constitutional: negative for fevers, chills, anorexia and weight loss  Eyes:   negative for visual disturbance and irritation  ENT:   negative for tinnitus,sore throat,nasal congestion,ear pain,hoarseness  Respiratory:  negative for cough, hemoptysis, dyspnea,wheezing  CV:   negative for chest pain, palpitations, lower extremity edema  GI:   negative for nausea, vomiting, diarrhea, abdominal pain,melena  Genitourinary: negative for frequency, dysuria and hematuria  Musculoskel: negative for myalgias, arthralgias, back pain, muscle weakness, joint pain  Neurological:  negative for headaches, dizziness, focal weakness, numbness  Psychiatric:     Negative for depression or anxiety      Past Medical History:   Diagnosis Date    CKD (chronic kidney disease)     Diabetes ([facility] [address]     ED (erectile dysfunction)     Hypercholesterolemia     Hypertension     Kendra-implantitis, dental        History reviewed. No pertinent surgical history. History reviewed. No pertinent family history. Social History     Socioeconomic History    Marital status:      Spouse name: Not on file    Number of children: Not on file    Years of education: Not on file    Highest education level: Not on file   Occupational History    Not on file   Tobacco Use    Smoking status: Never    Smokeless tobacco: Never   Substance and Sexual Activity    Alcohol use: No    Drug use: Not Currently    Sexual activity: Yes     Partners: Female     Comment: . one son. track star in hs. .    Other Topics Concern    Not on file   Social History Narrative    Not on file     Social Determinants of Health     Financial Resource Strain: Low Risk     Difficulty of Paying Living Expenses: Not hard at all Food Insecurity: No Food Insecurity    Worried About Running Out of Food in the Last Year: Never true    Ran Out of Food in the Last Year: Never true   Transportation Needs: Not on file   Physical Activity: Not on file   Stress: Not on file   Social Connections: Not on file   Intimate Partner Violence: Not on file   Housing Stability: Not on file          Visit Vitals  /80 (BP 1 Location: Left upper arm, BP Patient Position: Sitting)   Pulse 96   Temp 98.3 °F (36.8 °C) (Temporal)   Resp 16   Ht 5' 6\" (1.676 m)   Wt 183 lb (83 kg)   SpO2 96%   BMI 29.54 kg/m²       Physical Examination:   General - Well appearing male  HEENT - PERRL, TM no erythema/opacification, normal nasal turbinates, no oropharyngeal erythema or exudate, MMM  Neck - supple, no bruits, no thyroidomegaly, no lymphadenopathy  Pulm - clear to auscultation bilaterally  Cardio - RRR, normal S1 S2, no murmur  Abd - soft, nontender, no masses, no HSM  Extrem - no edema, +2 distal pulses  Neuro-  No focal deficits, CN intact     Assessment/Plan:     Seasonal and environmental allergies--continue allegra. Add astelin nasal spray  Dm, type 2--on jardiance, amaryl, glucophage.   Check a1c  Ckd 2--check bmp  Hyperlpids--on crestor  Htn--controlled with lotensin, norvasc  ED--has prn viagra    Reminded to get eye exam  Prevnar 20 given today  Rtc 6 months for cpe        Edwin Cline III, DO

## 2023-04-21 NOTE — PROGRESS NOTES
1. \"Have you been to the ER, urgent care clinic since your last visit? Hospitalized since your last visit? \" No    2. \"Have you seen or consulted any other health care providers outside of the [address] [place] [address] since your last visit? \" No     3. For patients aged 39-70: Has the patient had a colonoscopy / FIT/ Cologuard? Yes - no Care Gap present      If the patient is female:    4. For patients aged 41-77: Has the patient had a mammogram within the past 2 years? NA - based on age or sex      11. For patients aged 21-65: Has the patient had a pap smear?  NA - based on age or sex

## 2023-04-24 LAB
BUN SERPL-MCNC: 15 MG/DL (ref 8–27)
BUN/CREAT SERPL: 12 (ref 10–24)
CALCIUM SERPL-MCNC: 9.7 MG/DL (ref 8.6–10.2)
CHLORIDE SERPL-SCNC: 103 MMOL/L (ref 96–106)
CO2 SERPL-SCNC: 19 MMOL/L (ref 20–29)
CREAT SERPL-MCNC: 1.26 MG/DL (ref 0.76–1.27)
EGFRCR SERPLBLD CKD-EPI 2021: 65 ML/MIN/1.73
EST. AVERAGE GLUCOSE BLD GHB EST-MCNC: 174 MG/DL
GLUCOSE SERPL-MCNC: 89 MG/DL (ref 70–99)
HBA1C MFR BLD: 7.7 % (ref 4.8–5.6)
POTASSIUM SERPL-SCNC: 4.4 MMOL/L (ref 3.5–5.2)
SODIUM SERPL-SCNC: 144 MMOL/L (ref 134–144)

## 2023-04-24 RX ORDER — DULAGLUTIDE 0.75 MG/.5ML
0.75 INJECTION, SOLUTION SUBCUTANEOUS
Qty: 12 PEN | Refills: 3 | Status: SHIPPED | OUTPATIENT
Start: 2023-04-24

## 2023-07-21 RX ORDER — ROSUVASTATIN CALCIUM 40 MG/1
40 TABLET, COATED ORAL NIGHTLY
Qty: 90 TABLET | Refills: 3 | Status: SHIPPED | OUTPATIENT
Start: 2023-07-21

## 2023-10-20 ENCOUNTER — OFFICE VISIT (OUTPATIENT)
Age: 63
End: 2023-10-20
Payer: COMMERCIAL

## 2023-10-20 VITALS
RESPIRATION RATE: 16 BRPM | HEART RATE: 93 BPM | WEIGHT: 173 LBS | SYSTOLIC BLOOD PRESSURE: 125 MMHG | OXYGEN SATURATION: 96 % | TEMPERATURE: 98.2 F | BODY MASS INDEX: 27.8 KG/M2 | HEIGHT: 66 IN | DIASTOLIC BLOOD PRESSURE: 76 MMHG

## 2023-10-20 DIAGNOSIS — E78.5 HYPERLIPIDEMIA ASSOCIATED WITH TYPE 2 DIABETES MELLITUS (HCC): ICD-10-CM

## 2023-10-20 DIAGNOSIS — Z00.00 ANNUAL PHYSICAL EXAM: Primary | ICD-10-CM

## 2023-10-20 DIAGNOSIS — Z12.5 PROSTATE CANCER SCREENING: ICD-10-CM

## 2023-10-20 DIAGNOSIS — I10 ESSENTIAL (PRIMARY) HYPERTENSION: ICD-10-CM

## 2023-10-20 DIAGNOSIS — Z23 NEEDS FLU SHOT: ICD-10-CM

## 2023-10-20 DIAGNOSIS — N18.2 CKD STAGE 2 DUE TO TYPE 2 DIABETES MELLITUS (HCC): ICD-10-CM

## 2023-10-20 DIAGNOSIS — E11.22 CKD STAGE 2 DUE TO TYPE 2 DIABETES MELLITUS (HCC): ICD-10-CM

## 2023-10-20 DIAGNOSIS — E11.69 HYPERLIPIDEMIA ASSOCIATED WITH TYPE 2 DIABETES MELLITUS (HCC): ICD-10-CM

## 2023-10-20 DIAGNOSIS — E11.21 TYPE 2 DIABETES MELLITUS WITH DIABETIC NEPHROPATHY, WITHOUT LONG-TERM CURRENT USE OF INSULIN (HCC): ICD-10-CM

## 2023-10-20 PROBLEM — N18.30 CKD (CHRONIC KIDNEY DISEASE) STAGE 3, GFR 30-59 ML/MIN (HCC): Status: RESOLVED | Noted: 2019-04-10 | Resolved: 2023-10-20

## 2023-10-20 PROCEDURE — 90674 CCIIV4 VAC NO PRSV 0.5 ML IM: CPT | Performed by: INTERNAL MEDICINE

## 2023-10-20 PROCEDURE — 3078F DIAST BP <80 MM HG: CPT | Performed by: INTERNAL MEDICINE

## 2023-10-20 PROCEDURE — 3074F SYST BP LT 130 MM HG: CPT | Performed by: INTERNAL MEDICINE

## 2023-10-20 PROCEDURE — 90471 IMMUNIZATION ADMIN: CPT | Performed by: INTERNAL MEDICINE

## 2023-10-20 PROCEDURE — 99396 PREV VISIT EST AGE 40-64: CPT | Performed by: INTERNAL MEDICINE

## 2023-10-20 RX ORDER — DULAGLUTIDE 0.75 MG/.5ML
INJECTION, SOLUTION SUBCUTANEOUS
COMMUNITY
Start: 2023-10-07

## 2023-10-20 SDOH — ECONOMIC STABILITY: FOOD INSECURITY: WITHIN THE PAST 12 MONTHS, YOU WORRIED THAT YOUR FOOD WOULD RUN OUT BEFORE YOU GOT MONEY TO BUY MORE.: NEVER TRUE

## 2023-10-20 SDOH — ECONOMIC STABILITY: INCOME INSECURITY: HOW HARD IS IT FOR YOU TO PAY FOR THE VERY BASICS LIKE FOOD, HOUSING, MEDICAL CARE, AND HEATING?: NOT HARD AT ALL

## 2023-10-20 SDOH — ECONOMIC STABILITY: FOOD INSECURITY: WITHIN THE PAST 12 MONTHS, THE FOOD YOU BOUGHT JUST DIDN'T LAST AND YOU DIDN'T HAVE MONEY TO GET MORE.: NEVER TRUE

## 2023-10-20 SDOH — ECONOMIC STABILITY: HOUSING INSECURITY
IN THE LAST 12 MONTHS, WAS THERE A TIME WHEN YOU DID NOT HAVE A STEADY PLACE TO SLEEP OR SLEPT IN A SHELTER (INCLUDING NOW)?: NO

## 2023-10-20 ASSESSMENT — PATIENT HEALTH QUESTIONNAIRE - PHQ9
SUM OF ALL RESPONSES TO PHQ QUESTIONS 1-9: 0
SUM OF ALL RESPONSES TO PHQ QUESTIONS 1-9: 0
SUM OF ALL RESPONSES TO PHQ9 QUESTIONS 1 & 2: 0
1. LITTLE INTEREST OR PLEASURE IN DOING THINGS: 0
SUM OF ALL RESPONSES TO PHQ QUESTIONS 1-9: 0
SUM OF ALL RESPONSES TO PHQ QUESTIONS 1-9: 0
2. FEELING DOWN, DEPRESSED OR HOPELESS: 0

## 2023-10-20 NOTE — PROGRESS NOTES
Rene Jameson is a 63 y.o. male who presents for evaluation of annual cpe. Last seen by me April 21, 2023. Added trulicity then for J7Z 7. 7.  weight is down 10 lbs. He feels good. ROS:  Constitutional: negative for fevers, chills, anorexia and weight loss  Eyes:   negative for visual disturbance and irritation  ENT:   negative for tinnitus,sore throat,nasal congestion,ear pain,hoarseness  Respiratory:  negative for cough, hemoptysis, dyspnea,wheezing  CV:   negative for chest pain, palpitations, lower extremity edema  GI:   negative for nausea, vomiting, diarrhea, abdominal pain,melena  Genitourinary: negative for frequency, dysuria and hematuria  Musculoskel: negative for myalgias, arthralgias, back pain, muscle weakness, joint pain  Neurological:  negative for headaches, dizziness, focal weakness, numbness  Psychiatric:     Negative for depression or anxiety      Past Medical History:   Diagnosis Date    CKD (chronic kidney disease)     Diabetes ([address])     ED (erectile dysfunction)     Hypercholesterolemia     Hypertension     Meera-implantitis, dental        History reviewed. No pertinent surgical history. History reviewed. No pertinent family history. Social History     Socioeconomic History    Marital status:      Spouse name: Not on file    Number of children: Not on file    Years of education: Not on file    Highest education level: Not on file   Occupational History    Not on file   Tobacco Use    Smoking status: Never    Smokeless tobacco: Never   Substance and Sexual Activity    Alcohol use: No    Drug use: Not Currently    Sexual activity: Not on file     Comment: . one son. track star in hs. .    Other Topics Concern    Not on file   Social History Narrative    Not on file     Social Determinants of Health     Financial Resource Strain: Low Risk  (10/20/2023)    Overall Financial Resource Strain (CARDIA)     Difficulty of Paying Living Expenses: Not hard at all   Food

## 2023-10-20 NOTE — PROGRESS NOTES
1. \"Have you been to the ER, urgent care clinic since your last visit? Hospitalized since your last visit? \" No     2. \"Have you seen or consulted any other health care providers outside of the [address] [place] [address] since your last visit? \" No      3. For patients aged 43-73: Has the patient had a colonoscopy / FIT/ Cologuard?  No

## 2023-10-22 LAB
ALBUMIN SERPL-MCNC: 4.4 G/DL (ref 3.8–4.9)
ALBUMIN/CREAT UR: 25 MG/G CREAT (ref 0–29)
ALBUMIN/GLOB SERPL: 1.4 {RATIO} (ref 1.2–2.2)
ALP SERPL-CCNC: 91 IU/L (ref 44–121)
ALT SERPL-CCNC: 27 IU/L (ref 0–44)
AST SERPL-CCNC: 16 IU/L (ref 0–40)
BASOPHILS # BLD AUTO: 0 X10E3/UL (ref 0–0.2)
BASOPHILS NFR BLD AUTO: 1 %
BILIRUB SERPL-MCNC: 1 MG/DL (ref 0–1.2)
BUN SERPL-MCNC: 14 MG/DL (ref 8–27)
BUN/CREAT SERPL: 12 (ref 10–24)
CALCIUM SERPL-MCNC: 9.5 MG/DL (ref 8.6–10.2)
CHLORIDE SERPL-SCNC: 99 MMOL/L (ref 96–106)
CHOLEST SERPL-MCNC: 123 MG/DL (ref 100–199)
CO2 SERPL-SCNC: 21 MMOL/L (ref 20–29)
CREAT SERPL-MCNC: 1.18 MG/DL (ref 0.76–1.27)
CREAT UR-MCNC: 160.1 MG/DL
EGFRCR SERPLBLD CKD-EPI 2021: 71 ML/MIN/1.73
EOSINOPHIL # BLD AUTO: 0.1 X10E3/UL (ref 0–0.4)
EOSINOPHIL NFR BLD AUTO: 3 %
ERYTHROCYTE [DISTWIDTH] IN BLOOD BY AUTOMATED COUNT: 12.8 % (ref 11.6–15.4)
GLOBULIN SER CALC-MCNC: 3.2 G/DL (ref 1.5–4.5)
GLUCOSE SERPL-MCNC: 94 MG/DL (ref 70–99)
HBA1C MFR BLD: 7.2 % (ref 4.8–5.6)
HCT VFR BLD AUTO: 51.2 % (ref 37.5–51)
HDLC SERPL-MCNC: 35 MG/DL
HGB BLD-MCNC: 16.7 G/DL (ref 13–17.7)
IMM GRANULOCYTES # BLD AUTO: 0 X10E3/UL (ref 0–0.1)
IMM GRANULOCYTES NFR BLD AUTO: 0 %
LDLC SERPL CALC-MCNC: 64 MG/DL (ref 0–99)
LYMPHOCYTES # BLD AUTO: 1.4 X10E3/UL (ref 0.7–3.1)
LYMPHOCYTES NFR BLD AUTO: 32 %
MCH RBC QN AUTO: 30.4 PG (ref 26.6–33)
MCHC RBC AUTO-ENTMCNC: 32.6 G/DL (ref 31.5–35.7)
MCV RBC AUTO: 93 FL (ref 79–97)
MICROALBUMIN UR-MCNC: 40.6 UG/ML
MONOCYTES # BLD AUTO: 0.5 X10E3/UL (ref 0.1–0.9)
MONOCYTES NFR BLD AUTO: 10 %
NEUTROPHILS # BLD AUTO: 2.3 X10E3/UL (ref 1.4–7)
NEUTROPHILS NFR BLD AUTO: 54 %
PLATELET # BLD AUTO: 252 X10E3/UL (ref 150–450)
POTASSIUM SERPL-SCNC: 3.8 MMOL/L (ref 3.5–5.2)
PROT SERPL-MCNC: 7.6 G/DL (ref 6–8.5)
PSA SERPL-MCNC: 1.8 NG/ML (ref 0–4)
RBC # BLD AUTO: 5.5 X10E6/UL (ref 4.14–5.8)
SODIUM SERPL-SCNC: 140 MMOL/L (ref 134–144)
TRIGL SERPL-MCNC: 135 MG/DL (ref 0–149)
TSH SERPL DL<=0.005 MIU/L-ACNC: 1.99 UIU/ML (ref 0.45–4.5)
VLDLC SERPL CALC-MCNC: 24 MG/DL (ref 5–40)
WBC # BLD AUTO: 4.4 X10E3/UL (ref 3.4–10.8)

## 2023-12-13 RX ORDER — EMPAGLIFLOZIN 25 MG/1
25 TABLET, FILM COATED ORAL DAILY
Qty: 90 TABLET | Refills: 3 | Status: SHIPPED | OUTPATIENT
Start: 2023-12-13

## 2023-12-13 RX ORDER — BENAZEPRIL HYDROCHLORIDE 40 MG/1
40 TABLET ORAL DAILY
Qty: 90 TABLET | Refills: 3 | Status: SHIPPED | OUTPATIENT
Start: 2023-12-13

## 2023-12-13 RX ORDER — AMLODIPINE BESYLATE 10 MG/1
10 TABLET ORAL DAILY
Qty: 90 TABLET | Refills: 3 | Status: SHIPPED | OUTPATIENT
Start: 2023-12-13

## 2024-03-13 NOTE — TELEPHONE ENCOUNTER
Patient states he needs refill done thru [facility] [address] on file for his  TRULICITY 0.75 MG/0.5ML SOPN. Call if any questions. Thank you      Patient reminded of [address] Turn Around for refills

## 2024-03-14 RX ORDER — DULAGLUTIDE 0.75 MG/.5ML
INJECTION, SOLUTION SUBCUTANEOUS
Qty: 2 ML | Refills: 1 | Status: SHIPPED | OUTPATIENT
Start: 2024-03-14 | End: 2024-04-30

## 2024-04-19 RX ORDER — METFORMIN HYDROCHLORIDE 500 MG/1
1000 TABLET, EXTENDED RELEASE ORAL 2 TIMES DAILY WITH MEALS
Qty: 120 TABLET | Refills: 11 | Status: SHIPPED | OUTPATIENT
Start: 2024-04-19

## 2024-04-19 NOTE — TELEPHONE ENCOUNTER
PCP: Phan Joseph III,     Last appt:   10/20/2023    Future Appointments   Date Time Provider Department Center   5/10/2024 11:40 AM Phan Joseph III,  [facility]       Requested Prescriptions     Pending Prescriptions Disp Refills    metFORMIN (GLUCOPHAGE-XR) 500 MG extended release tablet 30 tablet      Sig: Take 2 tablets by mouth 2 times daily (with meals)

## 2024-04-19 NOTE — TELEPHONE ENCOUNTER
Caller requests Refill of:  metFORMIN (GLUCOPHAGE-XR) 500 MG extended release tablet        Please send to:    [facility] [address] [place] [address] - [address] - P [phone] - F [phone]  [address]  [facility] [address]  Phone: [phone] Fax: [phone]         Visit / Appointment History:  Future Appointment at [facility]:  5/10/24  Last Appointment With PCP:  10/20/2023       Caller confirmed instructions and dosages as correct.    Caller was advised that Meds will be refilled as soon as possible, however there can be a 48-72 business hour turn around on refill requests.

## 2024-04-30 RX ORDER — DULAGLUTIDE 0.75 MG/.5ML
INJECTION, SOLUTION SUBCUTANEOUS
Qty: 2 ML | Refills: 5 | Status: SHIPPED | OUTPATIENT
Start: 2024-04-30

## 2024-05-10 ENCOUNTER — OFFICE VISIT (OUTPATIENT)
Age: 64
End: 2024-05-10
Payer: COMMERCIAL

## 2024-05-10 ENCOUNTER — TELEPHONE (OUTPATIENT)
Age: 64
End: 2024-05-10

## 2024-05-10 VITALS
HEART RATE: 100 BPM | DIASTOLIC BLOOD PRESSURE: 77 MMHG | WEIGHT: 167.4 LBS | RESPIRATION RATE: 14 BRPM | SYSTOLIC BLOOD PRESSURE: 117 MMHG | OXYGEN SATURATION: 96 % | HEIGHT: 66 IN | BODY MASS INDEX: 26.9 KG/M2 | TEMPERATURE: 98 F

## 2024-05-10 DIAGNOSIS — E11.22 CKD STAGE 2 DUE TO TYPE 2 DIABETES MELLITUS (HCC): ICD-10-CM

## 2024-05-10 DIAGNOSIS — I10 ESSENTIAL (PRIMARY) HYPERTENSION: ICD-10-CM

## 2024-05-10 DIAGNOSIS — E11.69 HYPERLIPIDEMIA ASSOCIATED WITH TYPE 2 DIABETES MELLITUS (HCC): ICD-10-CM

## 2024-05-10 DIAGNOSIS — E11.21 TYPE 2 DIABETES MELLITUS WITH DIABETIC NEPHROPATHY, WITHOUT LONG-TERM CURRENT USE OF INSULIN (HCC): ICD-10-CM

## 2024-05-10 DIAGNOSIS — R30.0 DYSURIA: Primary | ICD-10-CM

## 2024-05-10 DIAGNOSIS — E78.5 HYPERLIPIDEMIA ASSOCIATED WITH TYPE 2 DIABETES MELLITUS (HCC): ICD-10-CM

## 2024-05-10 DIAGNOSIS — N18.2 CKD STAGE 2 DUE TO TYPE 2 DIABETES MELLITUS (HCC): ICD-10-CM

## 2024-05-10 PROCEDURE — 99214 OFFICE O/P EST MOD 30 MIN: CPT | Performed by: INTERNAL MEDICINE

## 2024-05-10 PROCEDURE — 3074F SYST BP LT 130 MM HG: CPT | Performed by: INTERNAL MEDICINE

## 2024-05-10 PROCEDURE — 3078F DIAST BP <80 MM HG: CPT | Performed by: INTERNAL MEDICINE

## 2024-05-10 ASSESSMENT — PATIENT HEALTH QUESTIONNAIRE - PHQ9
SUM OF ALL RESPONSES TO PHQ QUESTIONS 1-9: 0
2. FEELING DOWN, DEPRESSED OR HOPELESS: NOT AT ALL
1. LITTLE INTEREST OR PLEASURE IN DOING THINGS: NOT AT ALL
SUM OF ALL RESPONSES TO PHQ9 QUESTIONS 1 & 2: 0
SUM OF ALL RESPONSES TO PHQ QUESTIONS 1-9: 0

## 2024-05-10 NOTE — TELEPHONE ENCOUNTER
Returned patient's call in regards to recorded blood pressure readings.   Patient stated that his blood pressure was taken a second time and he did not see the second reading in mychart.   Patient informed that both readings are in mychart.   Patient verbalized understanding.

## 2024-05-10 NOTE — PROGRESS NOTES
\"Have you been to the ER, urgent care clinic since your last visit?  Hospitalized since your last visit?\"    NO    “Have you seen or consulted any other health care providers outside of [facility] since your last visit?”    NO            Click Here for Release of Records Request  
Living Expenses: Not hard at all   Food Insecurity: Not on file (10/20/2023)   Transportation Needs: Unknown (10/20/2023)    PRAPARE - Transportation     Lack of Transportation (Medical): Not on file     Lack of Transportation (Non-Medical): No   Physical Activity: Not on file   Stress: Not on file   Social Connections: Not on file   Intimate Partner Violence: Not on file   Housing Stability: Unknown (10/20/2023)    Housing Stability Vital Sign     Unable to Pay for Housing in the Last Year: Not on file     Number of Places Lived in the Last Year: Not on file     Unstable Housing in the Last Year: No          /86 (Site: Left Upper Arm, Position: Sitting, Cuff Size: Large Adult)   Pulse 100   Temp 98 °F (36.7 °C) (Temporal)   Resp 14   Ht 1.676 m (5' 6\")   Wt 75.9 kg (167 lb 6.4 oz)   SpO2 96%   BMI 27.02 kg/m²     Physical Examination:   General - Well appearing male  HEENT - PERRL, TM no erythema/opacification, normal nasal turbinates, no oropharyngeal erythema or exudate, MMM  Neck - supple, no bruits, no thyroidomegaly, no lymphadenopathy  Pulm - clear to auscultation bilaterally  Cardio - RRR, normal S1 S2, no murmur  Abd - soft, nontender, no masses, no HSM  Extrem - no edema, +2 distal pulses  Neuro-  No focal deficits, CN intact     Assessment/Plan:     Dysuria--check ua--negative for uti.  2+ glucose, from jardiance  Dm, type 2--on trulicity, jardiance, glucophage.  Check A1c  Ckd 2--check bmp  Htn--controlled with norvasc, lotensin  Hyperlipids--on crestor  ED--has viagra    Rtc 6 months for cpe        Phan Joseph,

## 2024-05-10 NOTE — TELEPHONE ENCOUNTER
Pt states he just left out office.  His vitals did not get corrected.      Pt is asking that these be corrected in system.    117/77 is the correct number not 131/86    Pt is calling from first floor now.

## 2024-05-11 LAB
APPEARANCE UR: CLEAR
BACTERIA #/AREA URNS HPF: NORMAL /[HPF]
BILIRUB UR QL STRIP: NEGATIVE
BUN SERPL-MCNC: 14 MG/DL (ref 8–27)
BUN/CREAT SERPL: 11 (ref 10–24)
CALCIUM SERPL-MCNC: 9.4 MG/DL (ref 8.6–10.2)
CASTS URNS QL MICRO: NORMAL /LPF
CHLORIDE SERPL-SCNC: 101 MMOL/L (ref 96–106)
CO2 SERPL-SCNC: 23 MMOL/L (ref 20–29)
COLOR UR: YELLOW
CREAT SERPL-MCNC: 1.24 MG/DL (ref 0.76–1.27)
EGFRCR SERPLBLD CKD-EPI 2021: 66 ML/MIN/1.73
EPI CELLS #/AREA URNS HPF: NORMAL /HPF (ref 0–10)
GLUCOSE SERPL-MCNC: 109 MG/DL (ref 70–99)
GLUCOSE UR QL STRIP: ABNORMAL
HBA1C MFR BLD: 7 % (ref 4.8–5.6)
HGB UR QL STRIP: NEGATIVE
KETONES UR QL STRIP: ABNORMAL
LEUKOCYTE ESTERASE UR QL STRIP: NEGATIVE
MICRO URNS: ABNORMAL
MICRO URNS: ABNORMAL
NITRITE UR QL STRIP: NEGATIVE
PH UR STRIP: 6.5 [PH] (ref 5–7.5)
POTASSIUM SERPL-SCNC: 4 MMOL/L (ref 3.5–5.2)
PROT UR QL STRIP: NEGATIVE
RBC #/AREA URNS HPF: NORMAL /HPF (ref 0–2)
SODIUM SERPL-SCNC: 144 MMOL/L (ref 134–144)
SP GR UR STRIP: >=1.03 (ref 1–1.03)
URINALYSIS REFLEX: ABNORMAL
UROBILINOGEN UR STRIP-MCNC: 0.2 MG/DL (ref 0.2–1)
WBC #/AREA URNS HPF: NORMAL /HPF (ref 0–5)

## 2024-05-20 ENCOUNTER — TELEPHONE (OUTPATIENT)
Age: 64
End: 2024-05-20

## 2024-05-20 RX ORDER — DULAGLUTIDE 0.75 MG/.5ML
INJECTION, SOLUTION SUBCUTANEOUS
Qty: 2 ML | Refills: 5 | Status: SHIPPED | OUTPATIENT
Start: 2024-05-20

## 2024-05-20 NOTE — TELEPHONE ENCOUNTER
Patient states he needs a call back to discuss Lab Results from his Most Recent Appt on 5/10/24 & also from those results Medication Adjustment or Refill would be needed. Please call to discuss & advise. Thank you

## 2024-05-20 NOTE — TELEPHONE ENCOUNTER
Returned patient's call in regards to urine results.   Per Dr. Joseph:     Urine was clean, no signs of any infection. Increase fluids, add cranberry tablet.  If not improved by Wednesday, he can come in for nurse visit and give us another urine sample.     Patient verbalized understanding.

## 2024-05-20 NOTE — TELEPHONE ENCOUNTER
Returned patient's call in regards to lab results.   Per Dr. Joseph:     Labs remain very good and stable.  Continue same meds.  Stay active, stay well.    Patient verbalized understanding.

## 2024-07-16 RX ORDER — ROSUVASTATIN CALCIUM 40 MG/1
40 TABLET, COATED ORAL NIGHTLY
Qty: 90 TABLET | Refills: 0 | Status: SHIPPED | OUTPATIENT
Start: 2024-07-16

## 2024-09-26 ENCOUNTER — TELEPHONE (OUTPATIENT)
Age: 64
End: 2024-09-26

## 2024-10-11 RX ORDER — ROSUVASTATIN CALCIUM 40 MG/1
40 TABLET, COATED ORAL NIGHTLY
Qty: 90 TABLET | Refills: 0 | Status: SHIPPED | OUTPATIENT
Start: 2024-10-11

## 2024-10-11 NOTE — TELEPHONE ENCOUNTER
Patient states he requested refill for Trulicity this Morning & needs to get refill done by Monday to [facility] on file. Please call if any questions. Thank you        Patient reminded of 48-72 Bus [address]r Turn Around on refills

## 2024-10-11 NOTE — TELEPHONE ENCOUNTER
Caller requests Refill of:    TRULICITY 0.75 MG/0.5ML SOPN SC injection     Please send to:    [facility] [address] [place] [address] - [address] - P [phone] - F [phone]  [address]  [facility] [address]  Phone: [phone] Fax: [phone]     Visit / Appointment History:  Future Appointment at [facility]:  10/21/2024   Last Appointment With PCP:  Visit date not found       Caller confirmed instructions and dosages as correct.    Caller was advised that Meds will be refilled as soon as possible, however there can be a 48-72 business hour turn around on refill requests.

## 2024-10-14 RX ORDER — DULAGLUTIDE 0.75 MG/.5ML
INJECTION, SOLUTION SUBCUTANEOUS
Qty: 4 ML | Refills: 0 | Status: SHIPPED | OUTPATIENT
Start: 2024-10-14

## 2024-10-21 ENCOUNTER — OFFICE VISIT (OUTPATIENT)
Age: 64
End: 2024-10-21
Payer: COMMERCIAL

## 2024-10-21 VITALS
SYSTOLIC BLOOD PRESSURE: 134 MMHG | WEIGHT: 173 LBS | OXYGEN SATURATION: 96 % | BODY MASS INDEX: 27.8 KG/M2 | TEMPERATURE: 98.1 F | DIASTOLIC BLOOD PRESSURE: 85 MMHG | HEART RATE: 100 BPM | HEIGHT: 66 IN | RESPIRATION RATE: 14 BRPM

## 2024-10-21 DIAGNOSIS — E78.5 HYPERLIPIDEMIA ASSOCIATED WITH TYPE 2 DIABETES MELLITUS (HCC): ICD-10-CM

## 2024-10-21 DIAGNOSIS — Z12.5 PROSTATE CANCER SCREENING: ICD-10-CM

## 2024-10-21 DIAGNOSIS — Z23 NEEDS FLU SHOT: ICD-10-CM

## 2024-10-21 DIAGNOSIS — E11.21 TYPE 2 DIABETES MELLITUS WITH DIABETIC NEPHROPATHY, WITHOUT LONG-TERM CURRENT USE OF INSULIN (HCC): ICD-10-CM

## 2024-10-21 DIAGNOSIS — N18.2 CKD STAGE 2 DUE TO TYPE 2 DIABETES MELLITUS (HCC): ICD-10-CM

## 2024-10-21 DIAGNOSIS — Z00.00 ANNUAL PHYSICAL EXAM: ICD-10-CM

## 2024-10-21 DIAGNOSIS — E11.69 HYPERLIPIDEMIA ASSOCIATED WITH TYPE 2 DIABETES MELLITUS (HCC): ICD-10-CM

## 2024-10-21 DIAGNOSIS — I10 ESSENTIAL (PRIMARY) HYPERTENSION: ICD-10-CM

## 2024-10-21 DIAGNOSIS — E11.22 CKD STAGE 2 DUE TO TYPE 2 DIABETES MELLITUS (HCC): ICD-10-CM

## 2024-10-21 DIAGNOSIS — Z00.00 ANNUAL PHYSICAL EXAM: Primary | ICD-10-CM

## 2024-10-21 PROCEDURE — 3075F SYST BP GE 130 - 139MM HG: CPT | Performed by: INTERNAL MEDICINE

## 2024-10-21 PROCEDURE — 90661 CCIIV3 VAC ABX FR 0.5 ML IM: CPT | Performed by: INTERNAL MEDICINE

## 2024-10-21 PROCEDURE — 99396 PREV VISIT EST AGE 40-64: CPT | Performed by: INTERNAL MEDICINE

## 2024-10-21 PROCEDURE — 90471 IMMUNIZATION ADMIN: CPT | Performed by: INTERNAL MEDICINE

## 2024-10-21 PROCEDURE — 3079F DIAST BP 80-89 MM HG: CPT | Performed by: INTERNAL MEDICINE

## 2024-10-21 SDOH — ECONOMIC STABILITY: FOOD INSECURITY: WITHIN THE PAST 12 MONTHS, YOU WORRIED THAT YOUR FOOD WOULD RUN OUT BEFORE YOU GOT MONEY TO BUY MORE.: NEVER TRUE

## 2024-10-21 SDOH — ECONOMIC STABILITY: FOOD INSECURITY: WITHIN THE PAST 12 MONTHS, THE FOOD YOU BOUGHT JUST DIDN'T LAST AND YOU DIDN'T HAVE MONEY TO GET MORE.: NEVER TRUE

## 2024-10-21 SDOH — ECONOMIC STABILITY: INCOME INSECURITY: HOW HARD IS IT FOR YOU TO PAY FOR THE VERY BASICS LIKE FOOD, HOUSING, MEDICAL CARE, AND HEATING?: NOT HARD AT ALL

## 2024-10-21 NOTE — PROGRESS NOTES
Golden Kiran is a 63 y.o. male who presents for evaluation of annual cpe.  Last seen by me may 10, 2024.  Weight up 6 lbs, but he feels well.  Left elbow was bothering him a few weeks ago, but feels better now.  Denies any trauma, just woke up one morning and it was sore.      ROS:  Constitutional: negative for fevers, chills, anorexia and weight loss  Eyes:   negative for visual disturbance and irritation  ENT:   negative for tinnitus,sore throat,nasal congestion,ear pain,hoarseness  Respiratory:  negative for cough, hemoptysis, dyspnea,wheezing  CV:   negative for chest pain, palpitations, lower extremity edema  GI:   negative for nausea, vomiting, diarrhea, abdominal pain,melena  Genitourinary: negative for frequency, dysuria and hematuria  Musculoskel: negative for myalgias, arthralgias, back pain, muscle weakness, joint pain  Neurological:  negative for headaches, dizziness, focal weakness, numbness  Psychiatric:     Negative for depression or anxiety      Past Medical History:   Diagnosis Date    CKD (chronic kidney disease)     Diabetes (HCC)     ED (erectile dysfunction)     Hypercholesterolemia     Hypertension     Meera-implantitis, dental        No past surgical history on file.    No family history on file.    Social History     Socioeconomic History    Marital status:      Spouse name: Not on file    Number of children: Not on file    Years of education: Not on file    Highest education level: Not on file   Occupational History    Not on file   Tobacco Use    Smoking status: Never    Smokeless tobacco: Never   Substance and Sexual Activity    Alcohol use: No    Drug use: Not Currently    Sexual activity: Not on file     Comment: . one son. track star in [place]. .   Other Topics Concern    Not on file   Social History Narrative    Not on file     Social Determinants of Health     Financial Resource Strain: Low Risk  (10/21/2024)    Overall Financial Resource Strain (CARDIA)

## 2024-10-21 NOTE — PROGRESS NOTES
\"Have you been to the ER, urgent care clinic since your last visit?  Hospitalized since your last visit?\"    NO    “Have you seen or consulted any other health care providers outside our system since your last visit?”    NO      “Have you had a diabetic eye exam?”    NO     Date of last diabetic eye exam: 12/16/2014

## 2024-10-22 LAB
ALBUMIN SERPL-MCNC: 4.6 G/DL (ref 3.9–4.9)
ALBUMIN/CREAT UR: 50 MG/G CREAT (ref 0–29)
ALP SERPL-CCNC: 97 IU/L (ref 44–121)
ALT SERPL-CCNC: 29 IU/L (ref 0–44)
AST SERPL-CCNC: 18 IU/L (ref 0–40)
BASOPHILS # BLD AUTO: 0 X10E3/UL (ref 0–0.2)
BASOPHILS NFR BLD AUTO: 1 %
BILIRUB SERPL-MCNC: 0.9 MG/DL (ref 0–1.2)
BUN SERPL-MCNC: 14 MG/DL (ref 8–27)
BUN/CREAT SERPL: 13 (ref 10–24)
CALCIUM SERPL-MCNC: 9.4 MG/DL (ref 8.6–10.2)
CHLORIDE SERPL-SCNC: 98 MMOL/L (ref 96–106)
CHOLEST SERPL-MCNC: 138 MG/DL (ref 100–199)
CO2 SERPL-SCNC: 23 MMOL/L (ref 20–29)
CREAT SERPL-MCNC: 1.11 MG/DL (ref 0.76–1.27)
CREAT UR-MCNC: 118.3 MG/DL
EGFRCR SERPLBLD CKD-EPI 2021: 76 ML/MIN/1.73
EOSINOPHIL # BLD AUTO: 0.1 X10E3/UL (ref 0–0.4)
EOSINOPHIL NFR BLD AUTO: 3 %
ERYTHROCYTE [DISTWIDTH] IN BLOOD BY AUTOMATED COUNT: 13.2 % (ref 11.6–15.4)
GLOBULIN SER CALC-MCNC: 3.3 G/DL (ref 1.5–4.5)
GLUCOSE SERPL-MCNC: 114 MG/DL (ref 70–99)
HBA1C MFR BLD: 7.8 % (ref 4.8–5.6)
HCT VFR BLD AUTO: 54.2 % (ref 37.5–51)
HDLC SERPL-MCNC: 43 MG/DL
HGB BLD-MCNC: 17.3 G/DL (ref 13–17.7)
IMM GRANULOCYTES # BLD AUTO: 0 X10E3/UL (ref 0–0.1)
IMM GRANULOCYTES NFR BLD AUTO: 0 %
LDLC SERPL CALC-MCNC: 68 MG/DL (ref 0–99)
LYMPHOCYTES # BLD AUTO: 1.4 X10E3/UL (ref 0.7–3.1)
LYMPHOCYTES NFR BLD AUTO: 32 %
MCH RBC QN AUTO: 30.5 PG (ref 26.6–33)
MCHC RBC AUTO-ENTMCNC: 31.9 G/DL (ref 31.5–35.7)
MCV RBC AUTO: 95 FL (ref 79–97)
MICROALBUMIN UR-MCNC: 58.9 UG/ML
MONOCYTES # BLD AUTO: 0.4 X10E3/UL (ref 0.1–0.9)
MONOCYTES NFR BLD AUTO: 9 %
NEUTROPHILS # BLD AUTO: 2.5 X10E3/UL (ref 1.4–7)
NEUTROPHILS NFR BLD AUTO: 55 %
PLATELET # BLD AUTO: 257 X10E3/UL (ref 150–450)
POTASSIUM SERPL-SCNC: 3.8 MMOL/L (ref 3.5–5.2)
PROT SERPL-MCNC: 7.9 G/DL (ref 6–8.5)
PSA SERPL-MCNC: 2 NG/ML (ref 0–4)
RBC # BLD AUTO: 5.68 X10E6/UL (ref 4.14–5.8)
SODIUM SERPL-SCNC: 140 MMOL/L (ref 134–144)
TRIGL SERPL-MCNC: 157 MG/DL (ref 0–149)
TSH SERPL DL<=0.005 MIU/L-ACNC: 2.35 UIU/ML (ref 0.45–4.5)
VLDLC SERPL CALC-MCNC: 27 MG/DL (ref 5–40)
WBC # BLD AUTO: 4.5 X10E3/UL (ref 3.4–10.8)

## 2024-10-23 NOTE — RESULT ENCOUNTER NOTE
Patient notified of results and response from Phan Joseph III, DO:    \"Dm is bit worse, A1c up to 7.8. will increase trulicity to 1.5 mg dose. Continue other meds as before.\"    Pt verbalized understanding.

## 2024-11-14 ENCOUNTER — TELEPHONE (OUTPATIENT)
Age: 64
End: 2024-11-14

## 2024-11-14 NOTE — TELEPHONE ENCOUNTER
Regarding medication:  dulaglutide (TRULICITY) 1.5 MG/0.5ML SC injection     Prior Auth needed      Insurance info confirmed as:  Carrier:  [employer]  ID Number:  [id number]  Rx Bin:  [id number]  Rx PCN: [id number]  Rx Group:  [id number]          Pharmacy:  [address] - P [phone] - F [phone]      PLEASE CALL PT TO UPDATE STATUS      Caller confirms readback of documented phone/fax number(s)       as correct.

## 2024-12-06 RX ORDER — EMPAGLIFLOZIN 25 MG/1
25 TABLET, FILM COATED ORAL DAILY
Qty: 90 TABLET | Refills: 3 | Status: SHIPPED | OUTPATIENT
Start: 2024-12-06

## 2024-12-06 RX ORDER — AMLODIPINE BESYLATE 10 MG/1
10 TABLET ORAL DAILY
Qty: 90 TABLET | Refills: 3 | Status: SHIPPED | OUTPATIENT
Start: 2024-12-06

## 2024-12-06 RX ORDER — BENAZEPRIL HYDROCHLORIDE 40 MG/1
40 TABLET ORAL DAILY
Qty: 90 TABLET | Refills: 3 | Status: SHIPPED | OUTPATIENT
Start: 2024-12-06

## 2024-12-17 NOTE — TELEPHONE ENCOUNTER
Pt has been very patient waiting for an answer about the trulicity that he is taking to save his life.    Pt has to get a call back about this PA.  This is for Trulicity.  This was all started in November and pt can yet to get an answer.      Pt is really trying to be patient he states.  Please call with status.

## 2024-12-18 NOTE — TELEPHONE ENCOUNTER
Called/Left Vm ON 12/18/24.    \"Pharmacy Confirmed  on 12/13/24. PA not needed.\"    Incoming call from pt from [facility]     Pt stated received a letter in mail stating a formulary change for Trulicity.    Called/Spoke with pt primary insurance// Insurance Rep-Cristina.    Stated that PA was not needed until 1/1/24.    Called/Spoke with pt-Notified of response  Verbalized Understanding.

## 2025-01-07 RX ORDER — ROSUVASTATIN CALCIUM 40 MG/1
40 TABLET, COATED ORAL NIGHTLY
Qty: 90 TABLET | Refills: 0 | Status: SHIPPED | OUTPATIENT
Start: 2025-01-07

## 2025-02-12 RX ORDER — METFORMIN HYDROCHLORIDE 500 MG/1
1000 TABLET, EXTENDED RELEASE ORAL 2 TIMES DAILY WITH MEALS
Qty: 120 TABLET | Refills: 11 | Status: SHIPPED | OUTPATIENT
Start: 2025-02-12

## 2025-02-12 NOTE — TELEPHONE ENCOUNTER
Requested Prescriptions     Pending Prescriptions Disp Refills    metFORMIN (GLUCOPHAGE-XR) 500 MG extended release tablet 120 tablet 11     Sig: Take 2 tablets by mouth 2 times daily (with meals)        Last refill: 11/08/24    LOV: 10/21/24    Next appt: not scheduled

## 2025-03-23 RX ORDER — DULAGLUTIDE 1.5 MG/.5ML
INJECTION, SOLUTION SUBCUTANEOUS
Qty: 6 ML | Refills: 3 | Status: SHIPPED | OUTPATIENT
Start: 2025-03-23

## 2025-04-02 RX ORDER — ROSUVASTATIN CALCIUM 40 MG/1
40 TABLET, COATED ORAL NIGHTLY
Qty: 90 TABLET | Refills: 3 | Status: SHIPPED | OUTPATIENT
Start: 2025-04-02

## 2025-04-24 RX ORDER — SILDENAFIL 100 MG/1
100 TABLET, FILM COATED ORAL PRN
Qty: 30 TABLET | Refills: 1 | Status: SHIPPED | OUTPATIENT
Start: 2025-04-24

## 2025-04-24 NOTE — TELEPHONE ENCOUNTER
Caller requests Refill of:  sildenafil (VIAGRA) 100 MG tablet    Please send to:    [facility] [address] [place] [address] - [address] - P [phone] - F [phone]  [address]  [facility] [address]  Phone: [phone] Fax: [phone]         Visit / Appointment History:  Future Appointment at [facility]:  Visit date not found   Last Appointment With PCP:  10/21/2024       Caller confirmed instructions and dosages as correct.    Caller was advised that Meds will be refilled as soon as possible, however there can be a 48-72 business hour turn around on refill requests.

## 2025-06-16 ENCOUNTER — TELEPHONE (OUTPATIENT)
Age: 65
End: 2025-06-16

## 2025-06-16 NOTE — TELEPHONE ENCOUNTER
Pt states that the Trulicity needs a PA on the 1.5       Can that be started as soon as possible as this is really working for him.      Thanks.